# Patient Record
Sex: FEMALE | Race: WHITE | NOT HISPANIC OR LATINO | Employment: OTHER | URBAN - METROPOLITAN AREA
[De-identification: names, ages, dates, MRNs, and addresses within clinical notes are randomized per-mention and may not be internally consistent; named-entity substitution may affect disease eponyms.]

---

## 2017-01-09 ENCOUNTER — OFFICE VISIT (OUTPATIENT)
Dept: MEDICAL GROUP | Facility: MEDICAL CENTER | Age: 82
End: 2017-01-09
Payer: MEDICARE

## 2017-01-09 VITALS
WEIGHT: 130 LBS | HEIGHT: 62 IN | SYSTOLIC BLOOD PRESSURE: 134 MMHG | HEART RATE: 78 BPM | BODY MASS INDEX: 23.92 KG/M2 | TEMPERATURE: 97.8 F | RESPIRATION RATE: 16 BRPM | DIASTOLIC BLOOD PRESSURE: 72 MMHG | OXYGEN SATURATION: 95 %

## 2017-01-09 DIAGNOSIS — F03.90 DEMENTIA WITHOUT BEHAVIORAL DISTURBANCE, UNSPECIFIED DEMENTIA TYPE: ICD-10-CM

## 2017-01-09 DIAGNOSIS — Z00.00 MEDICARE ANNUAL WELLNESS VISIT, SUBSEQUENT: ICD-10-CM

## 2017-01-09 DIAGNOSIS — M81.0 OSTEOPOROSIS: ICD-10-CM

## 2017-01-09 DIAGNOSIS — F41.9 ANXIETY: ICD-10-CM

## 2017-01-09 DIAGNOSIS — F31.81 BIPOLAR 2 DISORDER (HCC): ICD-10-CM

## 2017-01-09 DIAGNOSIS — K57.80 DIVERTICULITIS OF INTESTINE WITH PERFORATION WITHOUT BLEEDING, UNSPECIFIED PART OF INTESTINAL TRACT: ICD-10-CM

## 2017-01-09 PROCEDURE — 99213 OFFICE O/P EST LOW 20 MIN: CPT | Mod: 25 | Performed by: NURSE PRACTITIONER

## 2017-01-09 PROCEDURE — G0439 PPPS, SUBSEQ VISIT: HCPCS | Performed by: NURSE PRACTITIONER

## 2017-01-09 RX ORDER — ALPRAZOLAM 0.25 MG/1
0.25 TABLET ORAL NIGHTLY PRN
Qty: 15 TAB | Refills: 5 | Status: SHIPPED | OUTPATIENT
Start: 2017-01-09 | End: 2017-03-03

## 2017-01-09 ASSESSMENT — PATIENT HEALTH QUESTIONNAIRE - PHQ9: CLINICAL INTERPRETATION OF PHQ2 SCORE: 0

## 2017-01-09 NOTE — MR AVS SNAPSHOT
"        Joelle Sotelo   2017 2:00 PM   Office Visit   MRN: [id number]    Department:  [address] [place] [address]   Dept Phone:  [phone]    Description:  Female : 1928   Provider:  LEANNA Daniel           Reason for Visit     Medication Refill xanax       Allergies as of 2017     Allergen Noted Reactions    Morphine 2015       Pt got extremely confused and agitated after morphine dose.       You were diagnosed with     Anxiety   [626406]       Dementia without behavioral disturbance, unspecified dementia type   [9470154]       Osteoporosis   [9659422]       Bipolar 2 disorder (HCC)   [980486]       Diverticulitis of intestine with perforation without bleeding, unspecified part of intestinal tract   [6034043]       H/O colostomy   [052924]         Vital Signs     Blood Pressure Pulse Temperature Respirations Height Weight    134/72 mmHg 78 36.6 °C (97.8 °F) 16 1.575 m (5' 2.01\") 58.968 kg (130 lb)    Body Mass Index Oxygen Saturation Smoking Status             23.77 kg/m2 95% Never Smoker          Basic Information     Date Of Birth Sex Race Ethnicity Preferred Language    1928 Female White Non- English      Your appointments     2017 11:00 AM   Follow Up Visit with Anoop Rodriguez M.D.   [facility] (--)    [address] [place] [address], [address]  [facility] [address] [phone]           You will be receiving a confirmation call a few days before your appointment from our automated call confirmation system.              Problem List              ICD-10-CM Priority Class Noted - Resolved    Bipolar 2 disorder (HCC) F31.81   5/15/2013 - Present    Osteoporosis M81.0   2014 - Present    Diverticulitis of intestine with perforation K57.80 High  2015 - Present    MEDICAL HOME    2015 - Present    H/O colostomy Z98.890   3/31/2015 - Present    Risk for falls Z91.81   2015 - Present    Dementia F03.90   2016 - Present      Health " Maintenance        Date Due Completion Dates    IMM ZOSTER VACCINE 2/17/1988 ---    IMM INFLUENZA (1) 9/1/2016 10/25/2015, 10/2/2014    BONE DENSITY 9/26/2016 9/26/2011, 9/24/2010, 9/23/2009, 9/8/2008, 7/20/2006, 6/11/2004            Current Immunizations     13-VALENT PCV PREVNAR 10/25/2015  9:12 PM    Influenza TIV (IM) 10/2/2014    Influenza Vaccine Adult HD 10/25/2015 10:06 PM    Influenza Vaccine Quad Inj (Preserved) 11/12/2016    Pneumococcal polysaccharide vaccine (PPSV-23) 11/2/2012      Below and/or attached are the medications your provider expects you to take. Review all of your home medications and newly ordered medications with your provider and/or pharmacist. Follow medication instructions as directed by your provider and/or pharmacist. Please keep your medication list with you and share with your provider. Update the information when medications are discontinued, doses are changed, or new medications (including over-the-counter products) are added; and carry medication information at all times in the event of emergency situations     Allergies:  MORPHINE - (reactions not documented)               Medications  Valid as of: January 09, 2017 -  2:24 PM    Generic Name Brand Name Tablet Size Instructions for use    ALPRAZolam (Tab) XANAX 0.25 MG Take 1 Tab by mouth at bedtime as needed for Sleep.        Ferrous Sulfate (Tab) ferrous sulfate 325 (65 FE) MG Take 1 Tab by mouth every day.        Misc. Devices (Misc) Misc. Devices  Incontinence briefs to use PRN due to incontinence.        RisperiDONE (Tab) RISPERDAL 4 MG TAKE  ONE TABLET BY MOUTH NIGHTLY AT BEDTIME        Saccharomyces boulardii (Cap) FLORASTOR 250 MG Take 1 Cap by mouth every day.        .                 Medicines prescribed today were sent to:     [id number] - [place], [place] - [address] [address]    Phone: [phone] Fax: [phone]    Open 24 Hours?: No      Medication refill instructions:        If your prescription bottle indicates you have medication refills left, it is not necessary to call your provider’s office. Please contact your pharmacy and they will refill your medication.    If your prescription bottle indicates you do not have any refills left, you may request refills at any time through one of the following ways: The online [employer] system (except Urgent Care), by calling your provider’s office, or by asking your pharmacy to contact your provider’s office with a refill request. Medication refills are processed only during regular business hours and may not be available until the next business day. Your provider may request additional information or to have a follow-up visit with you prior to refilling your medication.   *Please Note: Medication refills are assigned a new Rx number when refilled electronically. Your pharmacy may indicate that no refills were authorized even though a new prescription for the same medication is available at the pharmacy. Please request the medicine by name with the pharmacy before contacting your provider for a refill.        Other Notes About Your Plan     Patient is enrolled in Medical home with [facility] Status: Patient Declined

## 2017-01-10 NOTE — PROGRESS NOTES
CC: Patient is here today accompanied by her friend for annual Medicare wellness visit as well as issues with anxiety.    HPI:   Joelle presents today with the following.    1. Medicare annual wellness visit, subsequent  Screening performed below.      2. Anxiety  Patient would like a refill on Xanax for anxiety and sleep. It does not appear she has filled the prescription in a few months but she states she likes to have it available when she is especially anxious. She states she has had no problems with falls or oversedation when she took the medicine. She does not feel she needs to take a daily SSRI since her anxiety tends to come and go. It usually affects her in the night and that is when she takes the medicine.    3. Dementia without behavioral disturbance, unspecified dementia type  Patient has been seen by neurology and continues on Risperdal. Her friend who is with her states he and his wife continue to check on the patient every day. They do not feel that she is at risk.    4. Osteoporosis  Patient has previous history of osteoporosis but did not want to use Fosamax. No recent fractures. She does not want bone density scans.    5. Bipolar 2 disorder (HCC)  Patient currently taking Risperdal and does not wish counseling.    6. Diverticulitis of intestine with perforation without bleeding, unspecified part of intestinal tract  No recurrence of diverticulitis.    7. H/O colostomy  Patient states her colostomy is working well and I have been providing her with supplies.      Depression Screening    Little interest or pleasure in doing things?  0 - not at all  Feeling down, depressed , or hopeless? 0 - not at all  Trouble falling or staying asleep, or sleeping too much?     Feeling tired or having little energy?     Poor appetite or overeating?     Feeling bad about yourself - or that you are a failure or have let yourself or your family down?    Trouble concentrating on things, such as reading the newspaper or  watching television?    Moving or speaking so slowly that other people could have noticed.  Or the opposite - being so fidgety or restless that you have been moving around a lot more than usual?     Thoughts that you would be better off dead, or of hurting yourself?     Patient Health Questionnaire Score:      If depressive symptoms identified deferred to follow up visit unless specifically addressed in assesment and plan.      Screening for Cognitive Impairment    Three Minute Recall (banana, sunrise, fence)  3/3    Draw clock face with all 12 numbers set to the hand to show 10 minures past 11 o'clock  1    Cognitive concerns identified defferred for follow up unless specifically addressed in assesment and plan.    Fall Risk Assessment    Has the patient had two or more falls in the last year or any fall with injury in the last year?  No    Safety Assessment    Throw rugs on floor.  Yes  Handrails on all stairs.  Yes  Good lighting in all hallways.  Yes  Difficulty hearing.  Yes  Patient counseled about all safety risks that were identified.    Functional Assessment ADLs    Are there any barriers preventing you from cooking for yourself or meeting nutritional needs?  No.    Are there any barriers preventing you from driving safely or obtaining transportation?  No.    Are there any barriers preventing you from using a telephone or calling for help?  No.    Are there any barriers preventing you from shopping?  No.    Are there any barriers preventing you from taking care of your own finances?  No.    Are there any barriers preventing you from managing your medications?  No.    Are currently engaing any exercise or physical activity?  Yes.       Health Maintenance Summary                IMM ZOSTER VACCINE Overdue 2/17/1988     Annual Wellness Visit Overdue 3/12/2016      Done 3/12/2015 Visit Dx: Medicare annual wellness visit, initial    IMM INFLUENZA Overdue 9/1/2016      Done 10/25/2015 Imm Admin: Influenza Vaccine  "Adult HD     Patient has more history with this topic...    BONE DENSITY Overdue 9/26/2016      Done 9/26/2011 DS-BONE DENSITY STUDY (DEXA)     Patient has more history with this topic...          Patient Care Team:  LEANNA Daniel as PCP - General (Family Medicine)  Laurel Whittaker R.N. as Medical Home Care Manager        Patient Active Problem List    Diagnosis Date Noted   • Diverticulitis of intestine with perforation 01/02/2015     Priority: High   • Dementia 07/07/2016   • Risk for falls 11/09/2015   • H/O colostomy 03/31/2015   • MEDICAL HOME 01/13/2015   • Osteoporosis 09/08/2014   • Bipolar 2 disorder (HCC) 05/15/2013       Current Outpatient Prescriptions   Medication Sig Dispense Refill   • alprazolam (XANAX) 0.25 MG Tab Take 1 Tab by mouth at bedtime as needed for Sleep. 15 Tab 5   • ferrous sulfate 325 (65 FE) MG tablet Take 1 Tab by mouth every day. 30 Tab 5   • Misc. Devices Misc Incontinence briefs to use PRN due to incontinence. 1 Package 11   • risperidone (RISPERDAL) 4 MG tablet TAKE  ONE TABLET BY MOUTH NIGHTLY AT BEDTIME 30 Tab 10   • saccharomyces boulardii (FLORASTOR) 250 MG Cap Take 1 Cap by mouth every day. 30 Cap 5     No current facility-administered medications for this visit.         Allergies as of 01/09/2017 - Bret as Reviewed 01/09/2017   Allergen Reaction Noted   • Morphine  01/04/2015        ROS: As per HPI.    /72 mmHg  Pulse 78  Temp(Src) 36.6 °C (97.8 °F)  Resp 16  Ht 1.575 m (5' 2.01\")  Wt 58.968 kg (130 lb)  BMI 23.77 kg/m2  SpO2 95%    Physical Exam:  Gen:         Alert and oriented, No apparent distress. Patient's friend helps with some of the history.  Neck:        No Lymphadenopathy or Bruits.  Lungs:     Clear to auscultation bilaterally  CV:          Regular rate and rhythm. No murmurs, rubs or gallops.  Abd:         Soft non tender, non distended. Normal active bowel sounds.  No  Hepatosplenomegaly, No pulsatile masses. Colostomy bag in place and " functioning well.                  Ext:          No clubbing, cyanosis, edema.      Assessment and Plan.   88 y.o. female with the following issues.    1. Medicare annual wellness visit, subsequent  Annual wellness topics discussed, review of chronic medical problems completed    - Annual Wellness Visit - Includes PPPS Subsequent ()    2. Anxiety  I reviewed again with patient and her friend the risks of this medication. She insists that she needs it to have available and I provided her with a prescription to use no more than 2 or 3 times per week if needed. Review of her pharmacy records show she has not been using it for a few months and is not getting medications from other offices.  - alprazolam (XANAX) 0.25 MG Tab; Take 1 Tab by mouth at bedtime as needed for Sleep.  Dispense: 15 Tab; Refill: 5    3. Dementia without behavioral disturbance, unspecified dementia type  Patient will follow up with neurology as needed. Her friend does not feel she is at risk and checks in on her daily.    4. Osteoporosis  I recommended considering a bone density scan.    5. Bipolar 2 disorder (HCC)  Patient is on Risperdal and feels it is helpful. She does not wish to see a psychiatrist.    6. Diverticulitis of intestine with perforation without bleeding, unspecified part of intestinal tract  Patient reports no problems with her bowels.    7. H/O colostomy  Patient's colostomy appears to be working well.

## 2017-01-12 ENCOUNTER — APPOINTMENT (OUTPATIENT)
Dept: NEUROLOGY | Facility: MEDICAL CENTER | Age: 82
End: 2017-01-12
Payer: MEDICARE

## 2017-01-27 ENCOUNTER — TELEPHONE (OUTPATIENT)
Dept: MEDICAL GROUP | Facility: MEDICAL CENTER | Age: 82
End: 2017-01-27

## 2017-01-27 NOTE — TELEPHONE ENCOUNTER
1. Caller Name: Jorje                                         Call Back Number: [phone]      Patient approves a detailed voicemail message: N/A    Caregiver called and is requesting a change to her xanax to #30 a day. He states that patient is taking one every night because it helps her sleep. I let him know what the last office note stated about only 2-3 times weekly but he would like to see about having it increased still. Please advise.

## 2017-01-27 NOTE — TELEPHONE ENCOUNTER
She was able to get by without any benzodiazepine medication for the last few months and when I saw her, I told her I do not want her using it daily because of the risks involved. I still did not want her using it more than 2-3 times per week. I will not write for a higher dosage or amount.

## 2017-02-03 ENCOUNTER — PATIENT OUTREACH (OUTPATIENT)
Dept: HEALTH INFORMATION MANAGEMENT | Facility: OTHER | Age: 82
End: 2017-02-03

## 2017-02-03 NOTE — PROGRESS NOTES
Outcome: Patient not due for AWV at this time.  dropped 1/9/17 -- No outreach made --  AWV    Attempt # Final    Annual Wellness Visit Scheduling  Scheduling Status: Not Scheduled  If Not Scheduled, Choose Reason Why: Patient not due at this time    Care Gap Scheduling (Attempt to Schedule EACH Overdue Care Gap!)  Health Maintenance Due   Topic Date Due   • IMM ZOSTER VACCINE  02/17/1988   • BONE DENSITY  09/26/2016         MyChart Activation:  Declined

## 2017-02-06 ENCOUNTER — TELEPHONE (OUTPATIENT)
Dept: HEALTH INFORMATION MANAGEMENT | Facility: OTHER | Age: 82
End: 2017-02-06

## 2017-02-06 NOTE — TELEPHONE ENCOUNTER
Received incoming call from ADSD worker Juana to report some concerns, she wanted PCP to be aware of for pt's appt. Pt has appt tomorrow 02/07/2017 with PCP.     ADSD report concerns related to pt's memory. Indicated pt seems to have difficulties with her short term memory. Also reported pt has a rash under breast that she would like PCP to look at. Lastly reported she does not believe pt is taking her medications. Indicated when she visited last Monday and Thursday, pt pill box was full. Pt took meds after being reminded by ADSD worker but all other days were full despite pt indicating she was taking her meds. ADSD would like to know if pt is not taking her medication, could this be detrimental to her health? Indicated that if pt not taking her medicine could affect her health agency may need to look into alternative options; thus, needs insight from provider to move forward.     Lastly reported pt is currently receiving personal care services and homemaker services from [facility]. He receives 45 mins/week in personal care/ADLs and 4 hours/week in homemaker/IADLs.     Plan:  · Information routed to PCP as FYI and for follow up with pt during appt.

## 2017-02-07 ENCOUNTER — OFFICE VISIT (OUTPATIENT)
Dept: MEDICAL GROUP | Facility: MEDICAL CENTER | Age: 82
End: 2017-02-07
Payer: MEDICARE

## 2017-02-07 VITALS
HEART RATE: 78 BPM | RESPIRATION RATE: 16 BRPM | DIASTOLIC BLOOD PRESSURE: 74 MMHG | SYSTOLIC BLOOD PRESSURE: 132 MMHG | BODY MASS INDEX: 25.58 KG/M2 | WEIGHT: 139 LBS | HEIGHT: 62 IN | OXYGEN SATURATION: 96 % | TEMPERATURE: 98.7 F

## 2017-02-07 DIAGNOSIS — F03.90 DEMENTIA WITHOUT BEHAVIORAL DISTURBANCE, UNSPECIFIED DEMENTIA TYPE: ICD-10-CM

## 2017-02-07 DIAGNOSIS — B37.2 CANDIDAL INTERTRIGO: ICD-10-CM

## 2017-02-07 DIAGNOSIS — F31.81 BIPOLAR 2 DISORDER (HCC): ICD-10-CM

## 2017-02-07 PROCEDURE — 99214 OFFICE O/P EST MOD 30 MIN: CPT | Performed by: NURSE PRACTITIONER

## 2017-02-07 PROCEDURE — 1101F PT FALLS ASSESS-DOCD LE1/YR: CPT | Performed by: NURSE PRACTITIONER

## 2017-02-07 PROCEDURE — 1036F TOBACCO NON-USER: CPT | Performed by: NURSE PRACTITIONER

## 2017-02-07 PROCEDURE — G8420 CALC BMI NORM PARAMETERS: HCPCS | Performed by: NURSE PRACTITIONER

## 2017-02-07 PROCEDURE — 4040F PNEUMOC VAC/ADMIN/RCVD: CPT | Performed by: NURSE PRACTITIONER

## 2017-02-07 PROCEDURE — G8432 DEP SCR NOT DOC, RNG: HCPCS | Performed by: NURSE PRACTITIONER

## 2017-02-07 PROCEDURE — G8482 FLU IMMUNIZE ORDER/ADMIN: HCPCS | Performed by: NURSE PRACTITIONER

## 2017-02-07 RX ORDER — KETOCONAZOLE 20 MG/G
1 CREAM TOPICAL DAILY
Qty: 1 TUBE | Refills: 0 | Status: SHIPPED | OUTPATIENT
Start: 2017-02-07 | End: 2017-03-03

## 2017-02-07 ASSESSMENT — ENCOUNTER SYMPTOMS: MEMORY LOSS: 1

## 2017-02-07 NOTE — MR AVS SNAPSHOT
"        Jolele Sotelo   2017 8:20 AM   Office Visit   MRN: [id number]    Department:  [address] [place] [address]   Dept Phone:  [phone]    Description:  Female : 1928   Provider:  Narciso Aguiar A.PDemetriusN.           Reason for Visit     Rash under breast, is been there for months       Allergies as of 2017     Allergen Noted Reactions    Morphine 2015       Pt got extremely confused and agitated after morphine dose.       You were diagnosed with     Dementia without behavioral disturbance, unspecified dementia type   [6889173]       Candidal intertrigo   [716923]       Bipolar 2 disorder (CMS-[facility])   [303410]       H/O colostomy   [392125]         Vital Signs     Blood Pressure Pulse Temperature Respirations Height Weight    132/74 mmHg 78 37.1 °C (98.7 °F) 16 1.575 m (5' 2.01\") 63.05 kg (139 lb)    Body Mass Index Oxygen Saturation Smoking Status             25.42 kg/m2 96% Never Smoker          Basic Information     Date Of Birth Sex Race Ethnicity Preferred Language    1928 Female White Non- English      Problem List              ICD-10-CM Priority Class Noted - Resolved    Bipolar 2 disorder (CMS-[facility]) F31.81   5/15/2013 - Present    Osteoporosis M81.0   2014 - Present    Diverticulitis of intestine with perforation K57.80 High  2015 - Present    MEDICAL HOME    2015 - Present    H/O colostomy Z98.890   3/31/2015 - Present    Risk for falls Z91.81   2015 - Present    Dementia F03.90   2016 - Present      Health Maintenance        Date Due Completion Dates    IMM ZOSTER VACCINE 1988 ---    BONE DENSITY 2016, 2010, 2009, 2008, 2006, 2004            Current Immunizations     13-VALENT PCV PREVNAR 10/25/2015  9:12 PM    Influenza TIV (IM) 10/2/2014    Influenza Vaccine Adult HD 10/25/2015 10:06 PM    Influenza Vaccine Quad Inj (Preserved) 2016    Pneumococcal polysaccharide vaccine (PPSV-23) 2012      "   Below and/or attached are the medications your provider expects you to take. Review all of your home medications and newly ordered medications with your provider and/or pharmacist. Follow medication instructions as directed by your provider and/or pharmacist. Please keep your medication list with you and share with your provider. Update the information when medications are discontinued, doses are changed, or new medications (including over-the-counter products) are added; and carry medication information at all times in the event of emergency situations     Allergies:  MORPHINE - (reactions not documented)               Medications  Valid as of: February 07, 2017 -  9:51 AM    Generic Name Brand Name Tablet Size Instructions for use    ALPRAZolam (Tab) XANAX 0.25 MG Take 1 Tab by mouth at bedtime as needed for Sleep.        Ferrous Sulfate (Tab) ferrous sulfate 325 (65 FE) MG Take 1 Tab by mouth every day.        Ketoconazole (Cream) NIZORAL 2 % Apply 1 Application to affected area(s) every day.        Misc. Devices (Misc) Misc. Devices  Incontinence briefs to use PRN due to incontinence.        RisperiDONE (Tab) RISPERDAL 4 MG TAKE  ONE TABLET BY MOUTH NIGHTLY AT BEDTIME        Saccharomyces boulardii (Cap) FLORASTOR 250 MG Take 1 Cap by mouth every day.        .                 Medicines prescribed today were sent to:     [id number] - [place], [place] - [address] [address]    Phone: [phone] Fax: [phone]    Open 24 Hours?: No      Medication refill instructions:       If your prescription bottle indicates you have medication refills left, it is not necessary to call your provider’s office. Please contact your pharmacy and they will refill your medication.    If your prescription bottle indicates you do not have any refills left, you may request refills at any time through one of the following ways: The online [employer] system (except Urgent Care), by calling your  provider’s office, or by asking your pharmacy to contact your provider’s office with a refill request. Medication refills are processed only during regular business hours and may not be available until the next business day. Your provider may request additional information or to have a follow-up visit with you prior to refilling your medication.   *Please Note: Medication refills are assigned a new Rx number when refilled electronically. Your pharmacy may indicate that no refills were authorized even though a new prescription for the same medication is available at the pharmacy. Please request the medicine by name with the pharmacy before contacting your provider for a refill.        Your To Do List     Future Labs/Procedures Complete By Expires    CBC WITH DIFFERENTIAL  As directed 2/8/2018    COMP METABOLIC PANEL  As directed 2/8/2018    TSH  As directed 2/8/2018    VITAMIN B12  As directed 2/7/2018      Referral     A referral request has been sent to our patient care coordination department. Please allow 3-5 business days for us to process this request and contact you either by phone or mail. If you do not hear from us by the 5th business day, please call us at [phone].        Other Notes About Your Plan     ADSD Worker: Juana [phone]           MyChart Status: Patient Declined

## 2017-02-07 NOTE — PROGRESS NOTES
Subjective:      Joelle Sotelo is a 88 y.o. female who presents with Rash            Rash    Joelle Sotelo is here today for rash under the breasts as well as some concerns about short-term memory.      1. Dementia without behavioral disturbance, unspecified dementia type  I received a message from patient's  regarding her ADSD worker concerns because patient did not take all of her medications for the week. Patient states she normally does take her medicines which are usually her iron , risperidone, and floristore. She has no history of hypertension, dyslipidemia or heart disease. Patient was seen by neurology last year with the above diagnosis made. She was supposed to have six-month follow-up this month but it looks like it was canceled. Patient feels she is okay and her home with assistance that comes in.    2. Candidal intertrigo  Patient states she noticed a rash under her breast a few weeks ago and it is mildly pruritic. She has no rash elsewhere.    3. Bipolar 2 disorder (CMS-[facility])  Patient is supposed to be on Risperdal 4 mg daily and there is no history of behavioral issues. She is pleasant and talkative in the office.    4. H/O colostomy  Patient reports she is doing well with caring for her colostomy.    Current Outpatient Prescriptions   Medication Sig Dispense Refill   • ketoconazole (NIZORAL) 2 % Cream Apply 1 Application to affected area(s) every day. 1 Tube 0   • alprazolam (XANAX) 0.25 MG Tab Take 1 Tab by mouth at bedtime as needed for Sleep. 15 Tab 5   • ferrous sulfate 325 (65 FE) MG tablet Take 1 Tab by mouth every day. 30 Tab 5   • Misc. Devices Misc Incontinence briefs to use PRN due to incontinence. 1 Package 11   • risperidone (RISPERDAL) 4 MG tablet TAKE  ONE TABLET BY MOUTH NIGHTLY AT BEDTIME 30 Tab 10   • saccharomyces boulardii (FLORASTOR) 250 MG Cap Take 1 Cap by mouth every day. 30 Cap 5     No current facility-administered medications for this visit.  "    Family History   Problem Relation Age of Onset   • Other Mother      unknown   • Other Father      unknown     Past Medical History   Diagnosis Date   • Bipolar 2 disorder (CMS-[facility]) 5/15/2013   • Dyslipidemia 5/15/2013     Social History   Substance Use Topics   • Smoking status: Never Smoker    • Smokeless tobacco: Never Used   • Alcohol Use: No       Review of Systems   Skin: Positive for itching and rash.   Psychiatric/Behavioral: Positive for memory loss.   All other systems reviewed and are negative.         Objective:     /74 mmHg  Pulse 78  Temp(Src) 37.1 °C (98.7 °F)  Resp 16  Ht 1.575 m (5' 2.01\")  Wt 63.05 kg (139 lb)  BMI 25.42 kg/m2  SpO2 96%     Physical Exam   Constitutional: She is oriented to person, place, and time. She appears well-developed and well-nourished. No distress.   HENT:   Head: Normocephalic and atraumatic.   Right Ear: External ear normal.   Left Ear: External ear normal.   Nose: Nose normal.   Eyes: Right eye exhibits no discharge. Left eye exhibits no discharge.   Neck: Normal range of motion. Neck supple. No thyromegaly present.   Cardiovascular: Normal rate, regular rhythm and normal heart sounds.  Exam reveals no gallop and no friction rub.    No murmur heard.  Pulmonary/Chest: Effort normal and breath sounds normal. She has no wheezes. She has no rales.   Musculoskeletal: She exhibits no edema or tenderness.   Neurological: She is alert and oriented to person, place, and time. She displays normal reflexes.   Skin: Skin is warm and dry. Rash noted. She is not diaphoretic.   Mildly erythematous, moist rash under the breasts bilaterally. Colostomy in place on abdomen.   Psychiatric: She has a normal mood and affect. Her behavior is normal. Judgment and thought content normal.   Patient able to answer questions appropriately in the office.   Nursing note and vitals reviewed.              Assessment/Plan:     1. Dementia without behavioral disturbance, unspecified " dementia type  I reminded patient that she missed her 6 month follow-up with neurology and new referral was made. I reminded patient to be sure she takes her medicines regularly and I will send my note on to the . If the ADSD worker does not feel she is able to care for herself at home, then intervention will be needed.  - REFERRAL TO NEUROLOGY  - COMP METABOLIC PANEL; Future  - CBC WITH DIFFERENTIAL; Future  - TSH; Future  - VITAMIN B12; Future    2. Candidal intertrigo  Patient advised to use this cream for up to 4 weeks.  - ketoconazole (NIZORAL) 2 % Cream; Apply 1 Application to affected area(s) every day.  Dispense: 1 Tube; Refill: 0    3. Bipolar 2 disorder (CMS-[facility])  Patient reminded to take her Risperdal daily.    4. H/O colostomy  Patient reports no problems with her colostomy.

## 2017-03-03 ENCOUNTER — APPOINTMENT (OUTPATIENT)
Dept: RADIOLOGY | Facility: MEDICAL CENTER | Age: 82
End: 2017-03-03
Attending: EMERGENCY MEDICINE
Payer: MEDICARE

## 2017-03-03 ENCOUNTER — RESOLUTE PROFESSIONAL BILLING HOSPITAL PROF FEE (OUTPATIENT)
Dept: HOSPITALIST | Facility: MEDICAL CENTER | Age: 82
End: 2017-03-03
Payer: MEDICARE

## 2017-03-03 ENCOUNTER — HOSPITAL ENCOUNTER (OUTPATIENT)
Facility: MEDICAL CENTER | Age: 82
End: 2017-03-04
Attending: EMERGENCY MEDICINE | Admitting: INTERNAL MEDICINE
Payer: MEDICARE

## 2017-03-03 DIAGNOSIS — R55 SYNCOPE, UNSPECIFIED SYNCOPE TYPE: ICD-10-CM

## 2017-03-03 PROBLEM — I20.89 SYNCOPE ANGINOSA (HCC): Status: ACTIVE | Noted: 2017-03-03

## 2017-03-03 LAB
ALBUMIN SERPL BCP-MCNC: 3.4 G/DL (ref 3.2–4.9)
ALBUMIN/GLOB SERPL: 0.9 G/DL
ALP SERPL-CCNC: 51 U/L (ref 30–99)
ALT SERPL-CCNC: <5 U/L (ref 2–50)
ANION GAP SERPL CALC-SCNC: 11 MMOL/L (ref 0–11.9)
APPEARANCE UR: CLEAR
APTT PPP: 28.7 SEC (ref 24.7–36)
AST SERPL-CCNC: 14 U/L (ref 12–45)
BACTERIA #/AREA URNS HPF: ABNORMAL /HPF
BASOPHILS # BLD AUTO: 0.6 % (ref 0–1.8)
BASOPHILS # BLD: 0.06 K/UL (ref 0–0.12)
BILIRUB SERPL-MCNC: 0.8 MG/DL (ref 0.1–1.5)
BILIRUB UR QL STRIP.AUTO: NEGATIVE
BUN SERPL-MCNC: 25 MG/DL (ref 8–22)
CALCIUM SERPL-MCNC: 9.3 MG/DL (ref 8.5–10.5)
CHLORIDE SERPL-SCNC: 101 MMOL/L (ref 96–112)
CO2 SERPL-SCNC: 26 MMOL/L (ref 20–33)
COLOR UR: YELLOW
CREAT SERPL-MCNC: 0.75 MG/DL (ref 0.5–1.4)
EOSINOPHIL # BLD AUTO: 0.11 K/UL (ref 0–0.51)
EOSINOPHIL NFR BLD: 1 % (ref 0–6.9)
EPI CELLS #/AREA URNS HPF: ABNORMAL /HPF
ERYTHROCYTE [DISTWIDTH] IN BLOOD BY AUTOMATED COUNT: 42 FL (ref 35.9–50)
EST. AVERAGE GLUCOSE BLD GHB EST-MCNC: 103 MG/DL
GFR SERPL CREATININE-BSD FRML MDRD: >60 ML/MIN/1.73 M 2
GLOBULIN SER CALC-MCNC: 3.7 G/DL (ref 1.9–3.5)
GLUCOSE SERPL-MCNC: 165 MG/DL (ref 65–99)
GLUCOSE UR STRIP.AUTO-MCNC: NEGATIVE MG/DL
HBA1C MFR BLD: 5.2 % (ref 0–5.6)
HCT VFR BLD AUTO: 46.4 % (ref 37–47)
HGB BLD-MCNC: 15.1 G/DL (ref 12–16)
IMM GRANULOCYTES # BLD AUTO: 0.07 K/UL (ref 0–0.11)
IMM GRANULOCYTES NFR BLD AUTO: 0.7 % (ref 0–0.9)
INR PPP: 1.06 (ref 0.87–1.13)
KETONES UR STRIP.AUTO-MCNC: NEGATIVE MG/DL
LEUKOCYTE ESTERASE UR QL STRIP.AUTO: ABNORMAL
LYMPHOCYTES # BLD AUTO: 1.02 K/UL (ref 1–4.8)
LYMPHOCYTES NFR BLD: 9.6 % (ref 22–41)
MCH RBC QN AUTO: 28.8 PG (ref 27–33)
MCHC RBC AUTO-ENTMCNC: 32.5 G/DL (ref 33.6–35)
MCV RBC AUTO: 88.5 FL (ref 81.4–97.8)
MICRO URNS: ABNORMAL
MONOCYTES # BLD AUTO: 0.72 K/UL (ref 0–0.85)
MONOCYTES NFR BLD AUTO: 6.8 % (ref 0–13.4)
MUCOUS THREADS #/AREA URNS HPF: ABNORMAL /HPF
NEUTROPHILS # BLD AUTO: 8.68 K/UL (ref 2–7.15)
NEUTROPHILS NFR BLD: 81.3 % (ref 44–72)
NITRITE UR QL STRIP.AUTO: NEGATIVE
NRBC # BLD AUTO: 0 K/UL
NRBC BLD AUTO-RTO: 0 /100 WBC
PH UR STRIP.AUTO: 5 [PH]
PLATELET # BLD AUTO: 202 K/UL (ref 164–446)
PMV BLD AUTO: 10.6 FL (ref 9–12.9)
POTASSIUM SERPL-SCNC: 4 MMOL/L (ref 3.6–5.5)
PROT SERPL-MCNC: 7.1 G/DL (ref 6–8.2)
PROT UR QL STRIP: NEGATIVE MG/DL
PROTHROMBIN TIME: 14.1 SEC (ref 12–14.6)
RBC # BLD AUTO: 5.24 M/UL (ref 4.2–5.4)
RBC # URNS HPF: ABNORMAL /HPF
RBC UR QL AUTO: ABNORMAL
SODIUM SERPL-SCNC: 138 MMOL/L (ref 135–145)
SP GR UR STRIP.AUTO: 1.02
TROPONIN I SERPL-MCNC: <0.01 NG/ML (ref 0–0.04)
TSH SERPL DL<=0.005 MIU/L-ACNC: 2.79 UIU/ML (ref 0.3–3.7)
WBC # BLD AUTO: 10.7 K/UL (ref 4.8–10.8)
WBC #/AREA URNS HPF: ABNORMAL /HPF

## 2017-03-03 PROCEDURE — A9270 NON-COVERED ITEM OR SERVICE: HCPCS | Performed by: EMERGENCY MEDICINE

## 2017-03-03 PROCEDURE — 85730 THROMBOPLASTIN TIME PARTIAL: CPT

## 2017-03-03 PROCEDURE — 99285 EMERGENCY DEPT VISIT HI MDM: CPT

## 2017-03-03 PROCEDURE — 96372 THER/PROPH/DIAG INJ SC/IM: CPT

## 2017-03-03 PROCEDURE — 85025 COMPLETE CBC W/AUTO DIFF WBC: CPT

## 2017-03-03 PROCEDURE — G0378 HOSPITAL OBSERVATION PER HR: HCPCS

## 2017-03-03 PROCEDURE — 700102 HCHG RX REV CODE 250 W/ 637 OVERRIDE(OP): Performed by: EMERGENCY MEDICINE

## 2017-03-03 PROCEDURE — 84484 ASSAY OF TROPONIN QUANT: CPT

## 2017-03-03 PROCEDURE — 700111 HCHG RX REV CODE 636 W/ 250 OVERRIDE (IP): Performed by: INTERNAL MEDICINE

## 2017-03-03 PROCEDURE — 700105 HCHG RX REV CODE 258: Performed by: EMERGENCY MEDICINE

## 2017-03-03 PROCEDURE — 84443 ASSAY THYROID STIM HORMONE: CPT

## 2017-03-03 PROCEDURE — 71010 DX-CHEST-PORTABLE (1 VIEW): CPT

## 2017-03-03 PROCEDURE — 83036 HEMOGLOBIN GLYCOSYLATED A1C: CPT

## 2017-03-03 PROCEDURE — 81001 URINALYSIS AUTO W/SCOPE: CPT

## 2017-03-03 PROCEDURE — 80053 COMPREHEN METABOLIC PANEL: CPT

## 2017-03-03 PROCEDURE — 93005 ELECTROCARDIOGRAM TRACING: CPT | Performed by: EMERGENCY MEDICINE

## 2017-03-03 PROCEDURE — 94760 N-INVAS EAR/PLS OXIMETRY 1: CPT

## 2017-03-03 PROCEDURE — 70450 CT HEAD/BRAIN W/O DYE: CPT

## 2017-03-03 PROCEDURE — 85610 PROTHROMBIN TIME: CPT

## 2017-03-03 PROCEDURE — 700105 HCHG RX REV CODE 258: Performed by: INTERNAL MEDICINE

## 2017-03-03 PROCEDURE — 99220 PR INITIAL OBSERVATION CARE,LEVL III: CPT | Performed by: INTERNAL MEDICINE

## 2017-03-03 RX ORDER — SODIUM CHLORIDE 9 MG/ML
1000 INJECTION, SOLUTION INTRAVENOUS ONCE
Status: COMPLETED | OUTPATIENT
Start: 2017-03-03 | End: 2017-03-03

## 2017-03-03 RX ORDER — BISACODYL 10 MG
10 SUPPOSITORY, RECTAL RECTAL
Status: DISCONTINUED | OUTPATIENT
Start: 2017-03-03 | End: 2017-03-04 | Stop reason: HOSPADM

## 2017-03-03 RX ORDER — AMOXICILLIN 250 MG
2 CAPSULE ORAL 2 TIMES DAILY
Status: DISCONTINUED | OUTPATIENT
Start: 2017-03-03 | End: 2017-03-04 | Stop reason: HOSPADM

## 2017-03-03 RX ORDER — HEPARIN SODIUM 5000 [USP'U]/ML
5000 INJECTION, SOLUTION INTRAVENOUS; SUBCUTANEOUS EVERY 8 HOURS
Status: DISCONTINUED | OUTPATIENT
Start: 2017-03-03 | End: 2017-03-04 | Stop reason: HOSPADM

## 2017-03-03 RX ORDER — SODIUM CHLORIDE 9 MG/ML
INJECTION, SOLUTION INTRAVENOUS CONTINUOUS
Status: DISPENSED | OUTPATIENT
Start: 2017-03-03 | End: 2017-03-04

## 2017-03-03 RX ORDER — POLYETHYLENE GLYCOL 3350 17 G/17G
1 POWDER, FOR SOLUTION ORAL
Status: DISCONTINUED | OUTPATIENT
Start: 2017-03-03 | End: 2017-03-04 | Stop reason: HOSPADM

## 2017-03-03 RX ADMIN — HEPARIN SODIUM 5000 UNITS: 5000 INJECTION, SOLUTION INTRAVENOUS; SUBCUTANEOUS at 17:48

## 2017-03-03 RX ADMIN — SODIUM CHLORIDE: 9 INJECTION, SOLUTION INTRAVENOUS at 17:44

## 2017-03-03 RX ADMIN — SODIUM CHLORIDE 1000 ML: 9 INJECTION, SOLUTION INTRAVENOUS at 12:45

## 2017-03-03 RX ADMIN — ASPIRIN 325 MG: 325 TABLET, DELAYED RELEASE ORAL at 13:45

## 2017-03-03 ASSESSMENT — LIFESTYLE VARIABLES
TOTAL SCORE: 0
HOW MANY TIMES IN THE PAST YEAR HAVE YOU HAD 5 OR MORE DRINKS IN A DAY: 0
TOTAL SCORE: 0
AVERAGE NUMBER OF DAYS PER WEEK YOU HAVE A DRINK CONTAINING ALCOHOL: 7
HAVE YOU EVER FELT YOU SHOULD CUT DOWN ON YOUR DRINKING: NO
ON A TYPICAL DAY WHEN YOU DRINK ALCOHOL HOW MANY DRINKS DO YOU HAVE: 1
EVER_SMOKED: NEVER
EVER HAD A DRINK FIRST THING IN THE MORNING TO STEADY YOUR NERVES TO GET RID OF A HANGOVER: NO
EVER FELT BAD OR GUILTY ABOUT YOUR DRINKING: NO
HAVE PEOPLE ANNOYED YOU BY CRITICIZING YOUR DRINKING: NO
CONSUMPTION TOTAL: NEGATIVE
DO YOU DRINK ALCOHOL: YES
EVER_SMOKED: NEVER
TOTAL SCORE: 0

## 2017-03-03 ASSESSMENT — PATIENT HEALTH QUESTIONNAIRE - PHQ9
1. LITTLE INTEREST OR PLEASURE IN DOING THINGS: NOT AT ALL
SUM OF ALL RESPONSES TO PHQ QUESTIONS 1-9: 0
2. FEELING DOWN, DEPRESSED, IRRITABLE, OR HOPELESS: NOT AT ALL
SUM OF ALL RESPONSES TO PHQ9 QUESTIONS 1 AND 2: 0

## 2017-03-03 ASSESSMENT — COPD QUESTIONNAIRES
DO YOU EVER COUGH UP ANY MUCUS OR PHLEGM?: NO/ONLY WITH OCCASIONAL COLDS OR INFECTIONS
COPD SCREENING SCORE: 2
HAVE YOU SMOKED AT LEAST 100 CIGARETTES IN YOUR ENTIRE LIFE: NO/DON'T KNOW
DURING THE PAST 4 WEEKS HOW MUCH DID YOU FEEL SHORT OF BREATH: NONE/LITTLE OF THE TIME
HAVE YOU SMOKED AT LEAST 100 CIGARETTES IN YOUR ENTIRE LIFE: NO/DON'T KNOW
DO YOU EVER COUGH UP ANY MUCUS OR PHLEGM?: NO/ONLY WITH OCCASIONAL COLDS OR INFECTIONS
DURING THE PAST 4 WEEKS HOW MUCH DID YOU FEEL SHORT OF BREATH: NONE/LITTLE OF THE TIME
COPD SCREENING SCORE: 2

## 2017-03-03 ASSESSMENT — PAIN SCALES - GENERAL
PAINLEVEL_OUTOF10: 0
PAINLEVEL_OUTOF10: 0

## 2017-03-03 NOTE — DISCHARGE PLANNING
Care Transition Team Assessment    Information Source  Orientation : Disoriented to Time  Information Given By: Patient  Informant's Name:  (carri)  Who is responsible for making decisions for patient? : Patient    Readmission Evaluation  Is this a readmission?: No    Elopement Risk  Legal Hold: No  Ambulatory or Self Mobile in Wheelchair: No-Not an Elopement Risk    Interdisciplinary Discharge Planning  Does Admitting Nurse Feel This Could be a Complex Discharge?: Yes  Primary Care Physician:  (Narciso Aguiar)  Lives with - Patient's Self Care Capacity: Alone and Able to Care For Self  Support Systems: None  Housing / Facility: 1 Story Apartment / Condo  Do You Take your Prescribed Medications Regularly:  (cant remember)  Able to Return to Previous ADL's:  (to the extint she is now able)  Mobility Issues:  (had gone to [employer] tp purchase cane when fainted)  Prior Services:  (unk)    Discharge Preparedness  What is your plan after discharge?:  (home, may need HH)  What are your discharge supports?:  (friends listed on FS only, remote family in [place])         Finances  Financial Barriers to Discharge: No              Advance Directive  Advance Directive?: None  Advance Directive offered?:  (given packet)    Domestic Abuse  Have you ever been the victim of abuse or violence?: No    Psychological Assessment  History of Substance Abuse:  (unk)    Discharge Risks or Barriers  Discharge risks or barriers?: Transportation, Mental health, Lives alone, no community support

## 2017-03-03 NOTE — ED NOTES
Pt expressing much concern regarding her cat at home, contacted social work regarding options for patient.

## 2017-03-03 NOTE — IP AVS SNAPSHOT
Vanilla Forums Access Code: Activation code not generated  Current Vanilla Forums Status: Patient Declined    "[employer]  A secure, online tool to manage your health information     [employer]’s Vanilla Forums® is a secure, online tool that connects you to your personalized health information from the privacy of your home -- day or night - making it very easy for you to manage your healthcare. Once the activation process is completed, you can even access your medical information using the Vanilla Forums miladys, which is available for free in the Apple Miladys store or Google Play store.     Vanilla Forums provides the following levels of access (as shown below):   My Chart Features   [facility] Primary Care Doctor [facility]  Specialists [facility]  Urgent  Care Non-[facility]  Primary Care  Doctor   Email your healthcare team securely and privately 24/7 X X X X   Manage appointments: schedule your next appointment; view details of past/upcoming appointments X      Request prescription refills. X      View recent personal medical records, including lab and immunizations X X X X   View health record, including health history, allergies, medications X X X X   Read reports about your outpatient visits, procedures, consult and ER notes X X X X   See your discharge summary, which is a recap of your hospital and/or ER visit that includes your diagnosis, lab results, and care plan. X X       How to register for Vanilla Forums:  1. Go to  [email or web address].  2. Click on the Sign Up Now box, which takes you to the New Member Sign Up page. You will need to provide the following information:  a. Enter your Vanilla Forums Access Code exactly as it appears at the top of this page. (You will not need to use this code after you’ve completed the sign-up process. If you do not sign up before the expiration date, you must request a new code.)   b. Enter your date of birth.   c. Enter your home email address.   d. Click Submit, and follow the next screen’s instructions.  3. Create a Vanilla Forums ID.  This will be your [employer] login ID and cannot be changed, so think of one that is secure and easy to remember.  4. Create a [employer] password. You can change your password at any time.  5. Enter your Password Reset Question and Answer. This can be used at a later time if you forget your password.   6. Enter your e-mail address. This allows you to receive e-mail notifications when new information is available in [employer].  7. Click Sign Up. You can now view your health information.    For assistance activating your [employer] account, call [phone]

## 2017-03-03 NOTE — ED NOTES
The Medication Reconciliation process has been completed by interviewing the patient who tells me that she does not take any meds.     Allergies have been reviewed  Antibiotic use in 30 days - NONE    Home Pharmacy:  [facility] - [place]

## 2017-03-03 NOTE — ED NOTES
Pt resting in bed with eyes closed, even and unlabored breathing. No distress noted. Awaiting bed placement.

## 2017-03-03 NOTE — DISCHARGE PLANNING
Assessment completed to best of pt ability, she is confused/demented on certain things.  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  Hygiene is poor to fair.   Lives in [facility] [facility] [place], no local family, uses bus for transportation.  May need official FREDDIE young, OT, PT prior to d/c.

## 2017-03-03 NOTE — ED NOTES
MD to bedside to update patient regarding resulting and pending admission. PT requesting food. V/o from doctor to give patient food. Verbalizes understanding.

## 2017-03-03 NOTE — ED PROVIDER NOTES
ED Provider Note    Scribed for Cristal Wellington M.D. by Chastity Calero. 3/3/2017, 12:27 PM.    Primary care provider: LEANNA Daniel  Means of arrival: EMS  History obtained from: Patient  History limited by: None     CHIEF COMPLAINT  Syncope     HPI  Joelle Sotelo is a 89 y.o. female who presents to the Emergency Department by ambulance following a syncopal episode which occurred at the grocery store just prior to arrival. Patient states she has been feeling well recently and is unsure what may have caused her symptoms. She denies having similar symptoms previously. Patient also denies chest pain or headaches as precursor symptoms to her syncopal episode. She reports she did not eat breakfast and this potentially could have been a cause, however she normally does not eat breakfast. Patient denies any episodes of vomiting, abdominal pain, nausea, hematochezia and diarrhea. She currently has a colostomy in place.     REVIEW OF SYSTEMS  Pertinent positives include near syncope, dizziness. Pertinent negatives include no chest pain, headache, nausea, vomiting, abdominal pain, hematochezia, diarrhea. All other systems reviewed and negative.     PAST MEDICAL HISTORY   has a past medical history of Bipolar 2 disorder (CMS-[facility]) (5/15/2013) and Dyslipidemia (5/15/2013).    SURGICAL HISTORY   has past surgical history that includes exploratory laparotomy (1/2/2015); colostomy (1/2/2015); sigmoid colectomy (1/2/2015); ercp (10/25/2015); and larissa by laparoscopy (10/25/2015).    SOCIAL HISTORY  Social History   Substance Use Topics   • Smoking status: Never Smoker    • Smokeless tobacco: Never Used   • Alcohol Use: No      History   Drug Use No       FAMILY HISTORY  Family History   Problem Relation Age of Onset   • Other Mother      unknown   • Other Father      unknown       CURRENT MEDICATIONS  Home Medications     Reviewed by Chuyita Lester R.N. (Registered Nurse) on 03/03/17 at 8405  [facility] List  "Status: Partial    Medication Last Dose Status    alprazolam (XANAX) 0.25 MG Tab  Active    ferrous sulfate 325 (65 FE) MG tablet  Active    ketoconazole (NIZORAL) 2 % Cream  Active    Misc. Devices Misc  Active    risperidone (RISPERDAL) 4 MG tablet  Active    saccharomyces boulardii (FLORASTOR) 250 MG Cap  Active                ALLERGIES  Allergies   Allergen Reactions   • Morphine      Pt got extremely confused and agitated after morphine dose.        PHYSICAL EXAM  VITAL SIGNS: /73 mmHg  Pulse 89  Temp(Src) 37.2 °C (98.9 °F)  Resp 17  Ht 1.575 m (5' 2\")  Wt 58.968 kg (130 lb)  BMI 23.77 kg/m2    Constitutional:  ELderly , able to answer questions  HENT: Nose is normal in appearance without rhinorrhea, external ears are normal,  moist mucous membranes  Eyes: Right pupil is reactive and 5 mm. Left pupil is reactive is a slightly smaller at 3 mm. Anicteric, there is no conjunctival drainage or pallor   Neck: The trachea is midline, there is no obvious mass or meningeal signs  Cardiovascular: Equal radial pulsation, regular rate and rhythm without murmurs gallops or rubs  Thorax & Lungs: Respiratory rate and effort are normal. There is normal chest excursion with respiration.  No wheezes rhonchi or rales noted.  Abdomen: Abdomen is normal in appearance. Normal brown stool in colostomy.   :  No CVA tenderness to palpation  Musculoskeletal: No deformities noted in all 4 extremities. Actively moves all 4 extremities  Skin: Visualized skin is warm, no erythema, no rash.  Neurologic:  Cranial nerves II through XII are intact there is no focal abnormality noted.  Psychiatric: Normal mood and mentation        DIAGNOSTIC STUDIES / PROCEDURES    LABS  Results for orders placed or performed during the hospital encounter of 03/03/17   CBC WITH DIFFERENTIAL   Result Value Ref Range    WBC 10.7 4.8 - 10.8 K/uL    RBC 5.24 4.20 - 5.40 M/uL    Hemoglobin 15.1 12.0 - 16.0 g/dL    Hematocrit 46.4 37.0 - 47.0 %    MCV " 88.5 81.4 - 97.8 fL    MCH 28.8 27.0 - 33.0 pg    MCHC 32.5 (L) 33.6 - 35.0 g/dL    RDW 42.0 35.9 - 50.0 fL    Platelet Count 202 164 - 446 K/uL    MPV 10.6 9.0 - 12.9 fL    Neutrophils-Polys 81.30 (H) 44.00 - 72.00 %    Lymphocytes 9.60 (L) 22.00 - 41.00 %    Monocytes 6.80 0.00 - 13.40 %    Eosinophils 1.00 0.00 - 6.90 %    Basophils 0.60 0.00 - 1.80 %    Immature Granulocytes 0.70 0.00 - 0.90 %    Nucleated RBC 0.00 /100 WBC    Neutrophils (Absolute) 8.68 (H) 2.00 - 7.15 K/uL    Lymphs (Absolute) 1.02 1.00 - 4.80 K/uL    Monos (Absolute) 0.72 0.00 - 0.85 K/uL    Eos (Absolute) 0.11 0.00 - 0.51 K/uL    Baso (Absolute) 0.06 0.00 - 0.12 K/uL    Immature Granulocytes (abs) 0.07 0.00 - 0.11 K/uL    NRBC (Absolute) 0.00 K/uL   COMP METABOLIC PANEL   Result Value Ref Range    Sodium 138 135 - 145 mmol/L    Potassium 4.0 3.6 - 5.5 mmol/L    Chloride 101 96 - 112 mmol/L    Co2 26 20 - 33 mmol/L    Anion Gap 11.0 0.0 - 11.9    Glucose 165 (H) 65 - 99 mg/dL    Bun 25 (H) 8 - 22 mg/dL    Creatinine 0.75 0.50 - 1.40 mg/dL    Calcium 9.3 8.5 - 10.5 mg/dL    AST(SGOT) 14 12 - 45 U/L    ALT(SGPT) <5 2 - 50 U/L    Alkaline Phosphatase 51 30 - 99 U/L    Total Bilirubin 0.8 0.1 - 1.5 mg/dL    Albumin 3.4 3.2 - 4.9 g/dL    Total Protein 7.1 6.0 - 8.2 g/dL    Globulin 3.7 (H) 1.9 - 3.5 g/dL    A-G Ratio 0.9 g/dL   TROPONIN   Result Value Ref Range    Troponin I <0.01 0.00 - 0.04 ng/mL   PRTOTHROMBIN TIME (INR)   Result Value Ref Range    PT 14.1 12.0 - 14.6 sec    INR 1.06 0.87 - 1.13   APTT   Result Value Ref Range    APTT 28.7 24.7 - 36.0 sec   ESTIMATED GFR   Result Value Ref Range    GFR If African American >60 >60 mL/min/1.73 m 2    GFR If Non African American >60 >60 mL/min/1.73 m 2   EKG (ER)   Result Value Ref Range    Report       [facility] Emergency Dept.    Test Date:  2017-03-03  Pt Name:    ROSS GUTIERREZ              Department: ER  MRN:        [id number]                      Room:         10  Gender:     F                            Technician: [id number]  :        [id number]                   Requested By:BOOM METZ  Order #:    [id number]                    Reading MD:    Measurements  Intervals                                Axis  Rate:       78                           P:          57  FL:         164                          QRS:        -79  QRSD:       78                           T:          25  QT:         372  QTc:        424    Interpretive Statements  SINUS ARRHYTHMIA, RATE  68- 91  CONSIDER INFERIOR INFARCT  Compared to ECG 10/24/2015 00:01:45  Myocardial infarct finding now present  Sinus tachycardia no longer present        All labs reviewed by me.    EKG  I interpreted this EKG myself.  This is a 12-lead study.  The rhythm is sinus with a rate of 78.  There are no ST segment nor T wave abnormalities.  Interpretation: No ST segment elevation myocardial infarction. Slight sinus arrhythmia noted.    RADIOLOGY  CT-HEAD W/O   Final Result      1.  Cerebral atrophy.      2.  White matter lucencies most consistent with small vessel ischemic change versus demyelination or gliosis.      3.  Otherwise, Head CT without contrast with no acute findings. No evidence of acute cerebral  hemorrhage or mass lesion.   4. Focal ectasia right vertebral artery.      DX-CHEST-PORTABLE (1 VIEW)   Final Result      Scattered interstitial opacities consistent with atelectasis/possible mild edema. Interstitial lung disease not excluded.   Left lung volume loss with mediastinal shift towards the left.        The radiologist's interpretation of all radiological studies have been reviewed by me.    COURSE & MEDICAL DECISION MAKING  Nursing notes and vital signs were reviewed. (See chart for details)  The patient's nursing records were reviewed which reveal the patient had a syncopal episode, history was obtained from the patient and she has never had similar symptoms previously;     The patient presents  following a syncopal episode, and the differential diagnosis includes but is not limited to syncope, drop attack, arrythmia, electrolyte abnormality.       12:27 PM Initial orders in the Emergency Department included cbc, cmp ekg, troponin, CT  head and initial treatment in the Emergency Department included NS hydration and the patient received IV  ns    ED testing reveals normal labs, CT concerning for vertebral calcifications, can't exclude posterior circulation symptoms, i.e. Drop attack.  DIscussed with pt, and willl admit.  Pt agreeable.  Asa 325 given.      FINAL IMPRESSION  1. Syncope, unspecified syncope type          I, Chastity Calero (Natalieibhilario), am scribing for, and in the presence of, Cristal Wellington M.D..    Electronically signed by: Chastity Calero (Deepti), 3/3/2017    ICristal M.D. personally performed the services described in this documentation, as scribed by Chastity Calero in my presence, and it is both accurate and complete.    The note accurately reflects work and decisions made by me.  Cristal Wellington  3/3/2017  1:23 PM

## 2017-03-03 NOTE — ED NOTES
Pt called out regarding pain in left arm Iv site. Swelling observed. IV fluids discontinued approx 200 ml NS infused. IV d/c'd. Warm compresses applied.

## 2017-03-03 NOTE — IP AVS SNAPSHOT
" Home Care Instructions                                                                                                                  Name:Joelle Sotelo  Medical Record Number:[id number]  CSN: [id number]    YOB: 1928   Age: 89 y.o.  Sex: female  HT:1.575 m (5' 2\") WT: 60.4 kg (133 lb 2.5 oz)          Admit Date: 3/3/2017     Discharge Date:   Today's Date: 3/4/2017  Attending Doctor:  Isaac Charles M.D.                  Allergies:  Morphine            Discharge Instructions       Discharge Instructions    Discharged to home by taxi with self. Discharged via wheelchair, hospital escort: Refused.  Special equipment needed: Cane    Be sure to schedule a follow-up appointment with your primary care doctor or any specialists as instructed.     Discharge Plan:   Diet Plan: Discussed  Activity Level: Discussed  Confirmed Follow up Appointment: Patient to Call and Schedule Appointment  Confirmed Symptoms Management: Discussed  Medication Reconciliation Updated: Yes  Influenza Vaccine Indication: Not indicated: Previously immunized this influenza season and > 8 years of age    I understand that a diet low in cholesterol, fat, and sodium is recommended for good health. Unless I have been given specific instructions below for another diet, I accept this instruction as my diet prescription.   Other diet: Heart-Healthy Eating Plan  Many factors influence your heart health, including eating and exercise habits. Heart (coronary) risk increases with abnormal blood fat (lipid) levels. Heart-healthy meal planning includes limiting unhealthy fats, increasing healthy fats, and making other small dietary changes. This includes maintaining a healthy body weight to help keep lipid levels within a normal range.  WHAT IS MY PLAN?   Your health care provider recommends that you:  · Reduce the total calories in your daily diet from fat.  · Limit your intake of saturated fat.  · Limit the amount of cholesterol in your " "diet.  WHAT TYPES OF FAT SHOULD I CHOOSE?  · Choose healthy fats more often. Choose monounsaturated and polyunsaturated fats, such as olive oil and canola oil, flaxseeds, walnuts, almonds, and seeds.  · Eat more omega-3 fats. Good choices include salmon, mackerel, sardines, tuna, flaxseed oil, and ground flaxseeds. Aim to eat fish at least two times each week.  · Limit saturated fats. Saturated fats are primarily found in animal products, such as meats, butter, and cream. Plant sources of saturated fats include palm oil, palm kernel oil, and coconut oil.  · Avoid foods with partially hydrogenated oils in them. These contain trans fats. Examples of foods that contain trans fats are stick margarine, some tub margarines, cookies, crackers, and other baked goods.  WHAT GENERAL GUIDELINES DO I NEED TO FOLLOW?  · Check food labels carefully to identify foods with trans fats or high amounts of saturated fat.  · Fill one half of your plate with vegetables and green salads. Eat 4-5 servings of vegetables per day. A serving of vegetables equals 1 cup of raw leafy vegetables, ½ cup of raw or cooked cut-up vegetables, or ½ cup of vegetable juice.  · Fill one fourth of your plate with whole grains. Look for the word \"whole\" as the first word in the ingredient list.  · Fill one fourth of your plate with lean protein foods.  · Eat 4-5 servings of fruit per day. A serving of fruit equals one medium whole fruit, ¼ cup of dried fruit, ½ cup of fresh, frozen, or canned fruit, or ½ cup of 100% fruit juice.  · Eat more foods that contain soluble fiber. Examples of foods that contain this type of fiber are apples, broccoli, carrots, beans, peas, and barley. Aim to get 20-30 g of fiber per day.  · Eat more home-cooked food and less restaurant, buffet, and fast food.  · Limit or avoid alcohol.  · Limit foods that are high in starch and sugar.  · Avoid fried foods.  · Cook foods by using methods other than frying. Baking, boiling, grilling, " and broiling are all great options. Other fat-reducing suggestions include:  · Removing the skin from poultry.  · Removing all visible fats from meats.  · Skimming the fat off of stews, soups, and gravies before serving them.  · Steaming vegetables in water or broth.  · Lose weight if you are overweight. Losing just 5-10% of your initial body weight can help your overall health and prevent diseases such as diabetes and heart disease.  · Increase your consumption of nuts, legumes, and seeds to 4-5 servings per week. One serving of dried beans or legumes equals ½ cup after being cooked, one serving of nuts equals 1½ ounces, and one serving of seeds equals ½ ounce or 1 tablespoon.  · You may need to monitor your salt (sodium) intake, especially if you have high blood pressure. Talk with your health care provider or dietitian to get more information about reducing sodium.  WHAT FOODS CAN I EAT?  Grains  Breads, including [place]ian, white, corina, wheat, raisin, rye, oatmeal, and Italian. Tortillas that are neither fried nor made with lard or trans fat. Low-fat rolls, including hotdog and hamburger buns and English muffins. Biscuits. Muffins. Waffles. Pancakes. Light popcorn. Whole-grain cereals. Flatbread. [place] toast. Pretzels. Breadsticks. Rusks. Low-fat snacks and crackers, including oyster, saltine, matzo, kelsey, animal, and rye. Rice and pasta, including brown rice and those that are made with whole wheat.  Vegetables  All vegetables.  Fruits  All fruits, but limit coconut.  Meats and Other Protein Sources  Lean, well-trimmed beef, veal, pork, and lamb. Chicken and turkey without skin. All fish and shellfish. Wild duck, rabbit, pheasant, and venison. Egg whites or low-cholesterol egg substitutes. Dried beans, peas, lentils, and tofu. Seeds and most nuts.  Dairy  Low-fat or nonfat cheeses, including ricotta, string, and mozzarella. Skim or 1% milk that is liquid, powdered, or evaporated. Buttermilk that is made with  low-fat milk. Nonfat or low-fat yogurt.  Beverages  Mineral water. Diet carbonated beverages.  Sweets and Desserts  Sherbets and fruit ices. Honey, jam, marmalade, jelly, and syrups. Meringues and gelatins. Pure sugar candy, such as hard candy, jelly beans, gumdrops, mints, marshmallows, and small amounts of dark chocolate. Messi food cake.  Eat all sweets and desserts in moderation.  Fats and Oils  Nonhydrogenated (trans-free) margarines. Vegetable oils, including soybean, sesame, sunflower, olive, peanut, safflower, corn, canola, and cottonseed. Salad dressings or mayonnaise that are made with a vegetable oil. Limit added fats and oils that you use for cooking, baking, salads, and as spreads.  Other  Cocoa powder. Coffee and tea. All seasonings and condiments.  The items listed above may not be a complete list of recommended foods or beverages. Contact your dietitian for more options.  WHAT FOODS ARE NOT RECOMMENDED?  Grains  Breads that are made with saturated or trans fats, oils, or whole milk. Croissants. Butter rolls. Cheese breads. Sweet rolls. Donuts. Buttered popcorn. Chow mein noodles. High-fat crackers, such as cheese or butter crackers.  Meats and Other Protein Sources  Fatty meats, such as hotdogs, short ribs, sausage, spareribs, siu, ribeye roast or steak, and mutton. High-fat deli meats, such as salami and bologna. Caviar. Domestic duck and goose. Organ meats, such as kidney, liver, sweetbreads, brains, gizzard, chitterlings, and heart.  Dairy  Cream, sour cream, cream cheese, and creamed cottage cheese. Whole milk cheeses, including blue (tej), [place], Brie, Trevor, American, Havarti, Swiss, cheddar, Camembert, and Lowry.  Whole or 2% milk that is liquid, evaporated, or condensed. Whole buttermilk. Cream sauce or high-fat cheese sauce. Yogurt that is made from whole milk.  Beverages  Regular sodas and drinks with added sugar.  Sweets and Desserts  Frosting. Pudding. Cookies. Cakes other  than javi food cake. Candy that has milk chocolate or white chocolate, hydrogenated fat, butter, coconut, or unknown ingredients. Buttered syrups. Full-fat ice cream or ice cream drinks.  Fats and Oils  Gravy that has suet, meat fat, or shortening. Cocoa butter, hydrogenated oils, palm oil, coconut oil, palm kernel oil. These can often be found in baked products, candy, fried foods, nondairy creamers, and whipped toppings. Solid fats and shortenings, including siu fat, salt pork, lard, and butter. Nondairy cream substitutes, such as coffee creamers and sour cream substitutes. Salad dressings that are made of unknown oils, cheese, or sour cream.  The items listed above may not be a complete list of foods and beverages to avoid. Contact your dietitian for more information.     This information is not intended to replace advice given to you by your health care provider. Make sure you discuss any questions you have with your health care provider.     Special Instructions: Urinary Tract Infection  Urinary tract infections (UTIs) can develop anywhere along your urinary tract. Your urinary tract is your body's drainage system for removing wastes and extra water. Your urinary tract includes two kidneys, two ureters, a bladder, and a urethra. Your kidneys are a pair of bean-shaped organs. Each kidney is about the size of your fist. They are located below your ribs, one on each side of your spine.  CAUSES  Infections are caused by microbes, which are microscopic organisms, including fungi, viruses, and bacteria. These organisms are so small that they can only be seen through a microscope. Bacteria are the microbes that most commonly cause UTIs.  SYMPTOMS   Symptoms of UTIs may vary by age and gender of the patient and by the location of the infection. Symptoms in young women typically include a frequent and intense urge to urinate and a painful, burning feeling in the bladder or urethra during urination. Older women and  men are more likely to be tired, shaky, and weak and have muscle aches and abdominal pain. A fever may mean the infection is in your kidneys. Other symptoms of a kidney infection include pain in your back or sides below the ribs, nausea, and vomiting.  DIAGNOSIS  To diagnose a UTI, your caregiver will ask you about your symptoms. Your caregiver also will ask to provide a urine sample. The urine sample will be tested for bacteria and white blood cells. White blood cells are made by your body to help fight infection.  TREATMENT   Typically, UTIs can be treated with medication. Because most UTIs are caused by a bacterial infection, they usually can be treated with the use of antibiotics. The choice of antibiotic and length of treatment depend on your symptoms and the type of bacteria causing your infection.  HOME CARE INSTRUCTIONS  · If you were prescribed antibiotics, take them exactly as your caregiver instructs you. Finish the medication even if you feel better after you have only taken some of the medication.  · Drink enough water and fluids to keep your urine clear or pale yellow.  · Avoid caffeine, tea, and carbonated beverages. They tend to irritate your bladder.  · Empty your bladder often. Avoid holding urine for long periods of time.  · Empty your bladder before and after sexual intercourse.  · After a bowel movement, women should cleanse from front to back. Use each tissue only once.  SEEK MEDICAL CARE IF:   · You have back pain.  · You develop a fever.  · Your symptoms do not begin to resolve within 3 days.  SEEK IMMEDIATE MEDICAL CARE IF:   · You have severe back pain or lower abdominal pain.  · You develop chills.  · You have nausea or vomiting.  · You have continued burning or discomfort with urination.  MAKE SURE YOU:   · Understand these instructions.  · Will watch your condition.  · Will get help right away if you are not doing well or get worse.     This information is not intended to replace advice  given to you by your health care provider. Make sure you discuss any questions you have with your health care provider.     Syncope  Syncope is a medical term for fainting or passing out. This means you lose consciousness and drop to the ground. People are generally unconscious for less than 5 minutes. You may have some muscle twitches for up to 15 seconds before waking up and returning to normal. Syncope occurs more often in older adults, but it can happen to anyone. While most causes of syncope are not dangerous, syncope can be a sign of a serious medical problem. It is important to seek medical care.   CAUSES   Syncope is caused by a sudden drop in blood flow to the brain. The specific cause is often not determined. Factors that can bring on syncope include:  · Taking medicines that lower blood pressure.  · Sudden changes in posture, such as standing up quickly.  · Taking more medicine than prescribed.  · Standing in one place for too long.  · Seizure disorders.  · Dehydration and excessive exposure to heat.  · Low blood sugar (hypoglycemia).  · Straining to have a bowel movement.  · Heart disease, irregular heartbeat, or other circulatory problems.  · Fear, emotional distress, seeing blood, or severe pain.  SYMPTOMS   Right before fainting, you may:  · Feel dizzy or light-headed.  · Feel nauseous.  · See all white or all black in your field of vision.  · Have cold, clammy skin.  DIAGNOSIS   Your health care provider will ask about your symptoms, perform a physical exam, and perform an electrocardiogram (ECG) to record the electrical activity of your heart. Your health care provider may also perform other heart or blood tests to determine the cause of your syncope which may include:  · Transthoracic echocardiogram (TTE). During echocardiography, sound waves are used to evaluate how blood flows through your heart.  · Transesophageal echocardiogram (ANUP).  · Cardiac monitoring. This allows your health care provider  to monitor your heart rate and rhythm in real time.  · Holter monitor. This is a portable device that records your heartbeat and can help diagnose heart arrhythmias. It allows your health care provider to track your heart activity for several days, if needed.  · Stress tests by exercise or by giving medicine that makes the heart beat faster.  TREATMENT   In most cases, no treatment is needed. Depending on the cause of your syncope, your health care provider may recommend changing or stopping some of your medicines.  HOME CARE INSTRUCTIONS  · Have someone stay with you until you feel stable.  · Do not drive, use machinery, or play sports until your health care provider says it is okay.  · Keep all follow-up appointments as directed by your health care provider.  · Lie down right away if you start feeling like you might faint. Breathe deeply and steadily. Wait until all the symptoms have passed.  · Drink enough fluids to keep your urine clear or pale yellow.  · If you are taking blood pressure or heart medicine, get up slowly and take several minutes to sit and then stand. This can reduce dizziness.  SEEK IMMEDIATE MEDICAL CARE IF:   · You have a severe headache.  · You have unusual pain in the chest, abdomen, or back.  · You are bleeding from your mouth or rectum, or you have black or tarry stool.  · You have an irregular or very fast heartbeat.  · You have pain with breathing.  · You have repeated fainting or seizure-like jerking during an episode.  · You faint when sitting or lying down.  · You have confusion.  · You have trouble walking.  · You have severe weakness.  · You have vision problems.  If you fainted, call your local emergency services (911 in [place] Do not drive yourself to the hospital.      This information is not intended to replace advice given to you by your health care provider. Make sure you discuss any questions you have with your health care provider.       · Is patient discharged on Warfarin /  Coumadin?   No     · Is patient Post Blood Transfusion?  No    Depression / Suicide Risk    As you are discharged from this [facility] facility, it is important to learn how to keep safe from harming yourself.    Recognize the warning signs:  · Abrupt changes in personality, positive or negative- including increase in energy   · Giving away possessions  · Change in eating patterns- significant weight changes-  positive or negative  · Change in sleeping patterns- unable to sleep or sleeping all the time   · Unwillingness or inability to communicate  · Depression  · Unusual sadness, discouragement and loneliness  · Talk of wanting to die  · Neglect of personal appearance   · Rebelliousness- reckless behavior  · Withdrawal from people/activities they love  · Confusion- inability to concentrate     If you or a loved one observes any of these behaviors or has concerns about self-harm, here's what you can do:  · Talk about it- your feelings and reasons for harming yourself  · Remove any means that you might use to hurt yourself (examples: pills, rope, extension cords, firearm)  · Get professional help from the community (Mental Health, Substance Abuse, psychological counseling)  · Do not be alone:Call your Safe Contact- someone whom you trust who will be there for you.  · Call your local CRISIS HOTLINE 578-1740 or 891-681-0806  · Call your local Children's Mobile Crisis Response Team [place] (923) 699-3376 or www.Jobydu  · Call the toll free National Suicide Prevention Hotlines   · National Suicide Prevention Lifeline 782-742-VSFW (2076)  · National Hope Line Network 800-SUICIDE (694-5249)        Follow-up Information     1. Follow up with LEANNA Daniel. Schedule an appointment as soon as possible for a visit in 1 week.    Specialty:  Family Medicine    Contact information    [address]  [place] [id number] [phone]           Discharge Medication Instructions:    Below are the  medications your physician expects you to take upon discharge:    Review all your home medications and newly ordered medications with your doctor and/or pharmacist. Follow medication instructions as directed by your doctor and/or pharmacist.    Please keep your medication list with you and share with your physician.               Medication List      START taking these medications        Instructions    sulfamethoxazole-trimethoprim 800-160 MG tablet   Last time this was given:  1 Tab on 3/4/2017  3:43 PM   Commonly known as:  BACTRIM DS    Take 1 Tab by mouth 2 times a day for 3 days.   Dose:  1 Tab               Instructions           Diet / Nutrition:    Follow any diet instructions given to you by your doctor or the dietician, including how much salt (sodium) you are allowed each day.    If you are overweight, talk to your doctor about a weight reduction plan.    Activity:    Remain physically active following your doctor's instructions about exercise and activity.    Rest often.     Any time you become even a little tired or short of breath, SIT DOWN and rest.    Worsening Symptoms:    Report any of the following signs and symptoms to the doctor's office immediately:    *Pain of jaw, arm, or neck  *Chest pain not relieved by medication                               *Dizziness or loss of consciousness  *Difficulty breathing even when at rest   *More tired than usual                                       *Bleeding drainage or swelling of surgical site  *Swelling of feet, ankles, legs or stomach                 *Fever (>100ºF)  *Pink or blood tinged sputum  *Weight gain (3lbs/day or 5lbs /week)           *Shock from internal defibrillator (if applicable)  *Palpitations or irregular heartbeats                *Cool and/or numb extremities    Stroke Awareness    Common Risk Factors for Stroke include:    Age  Atrial Fibrillation  Carotid Artery Stenosis  Diabetes Mellitus  Excessive alcohol consumption  High blood  pressure  Overweight   Physical inactivity  Smoking    Warning signs and symptoms of a stroke include:    *Sudden numbness or weakness of the face, arm or leg (especially on one side of the body).  *Sudden confusion, trouble speaking or understanding.  *Sudden trouble seeing in one or both eyes.  *Sudden trouble walking, dizziness, loss of balance or coordination.Sudden severe headache with no known cause.    It is very important to get treatment quickly when a stroke occurs. If you experience any of the above warning signs, call 911 immediately.                   Disclaimer         Quit Smoking / Tobacco Use:    I understand the use of any tobacco products increases my chance of suffering from future heart disease or stroke and could cause other illnesses which may shorten my life. Quitting the use of tobacco products is the single most important thing I can do to improve my health. For further information on smoking / tobacco cessation call a Toll Free Quit Line at 1-761.811.1200 (*National [employer] [place]) or 1-329.298.1579 (American Lung Association) or you can access the web based program at www.lungGigsTime.org.    [place] Tobacco Users Help Line:  (847) 319-2826       Toll Free: 1-378.873.9908  Quit Tobacco Program [facility] (136)053-9868    Crisis Hotline:    [place] Crisis Hotline:  9-234-GZVJFWP or 1-681.404.7923    [place] Crisis Hotline:    1-302.905.7486 or 216-176-4184    Discharge Survey:   Thank you for choosing [facility]. We hope we did everything we could to make your hospital stay a pleasant one. You may be receiving a phone survey and we would appreciate your time and participation in answering the questions. Your input is very valuable to us in our efforts to improve our service to our patients and their families.        My signature on this form indicates that:    1. I have reviewed and understand the above information.  2. My questions regarding this information have  been answered to my satisfaction.  3. I have formulated a plan with my discharge nurse to obtain my prescribed medications for home.                  Disclaimer         __________________________________                     __________       ________                       Patient Signature                                                 Date                    Time

## 2017-03-03 NOTE — IP AVS SNAPSHOT
" <p align=\"LEFT\"><IMG SRC=\"[email or web address]\" alt=\"Image\" WIDTH=\"50%\" HEIGHT=\"200\" BORDER=\"\"></p>                   Name:Joelle Sotelo  Medical Record Number:[id number]  CSN: [id number]    YOB: 1928   Age: 89 y.o.  Sex: female  HT:1.575 m (5' 2\") WT: 60.4 kg (133 lb 2.5 oz)          Admit Date: 3/3/2017     Discharge Date:   Today's Date: 3/4/2017  Attending Doctor:  Isaac Charles M.D.                  Allergies:  Morphine          Follow-up Information     1. Follow up with LEANNA Daniel. Schedule an appointment as soon as possible for a visit in 1 week.    Specialty:  Family Medicine    Contact information    [address] [id number] [phone]           Medication List      Take these Medications        Instructions    sulfamethoxazole-trimethoprim 800-160 MG tablet   Commonly known as:  BACTRIM DS    Take 1 Tab by mouth 2 times a day for 3 days.   Dose:  1 Tab         "

## 2017-03-03 NOTE — ED NOTES
"Pt brought to ER from [facility] via EMS.  Chief Complaint   Patient presents with   • Dizziness   Pt was walking through [facility], sudden onset lightheaded/dizzy. Drank Ensure from cart and felt better.     EMS reports friends of patient present at [facility]. report to EMS that patient \"acting her normal, normal speech, normal pupils\"       Initial NIH 0, denies dizziness/lightheaded upon arrival. PT repetitive.     "

## 2017-03-03 NOTE — ED NOTES
Completed swallow evaluation with patient, tolerated dry swallow, tolerated 3oz of water with no coughing. Pt given food per request and per v/o of ER MD

## 2017-03-03 NOTE — IP AVS SNAPSHOT
3/4/2017          Joelle Sotelo  [address]  [place] [address]    Dear Joelle:    Critical access hospital wants to ensure your discharge home is safe and you or your loved ones have had all your questions answered regarding your care after you leave the hospital.    You may receive a telephone call within two days of your discharge.  This call is to make certain you understand your discharge instructions as well as ensure we provided you with the best care possible during your stay with us.     The call will only last approximately 3-5 minutes and will be done by a nurse.    Once again, we want to ensure your discharge home is safe and that you have a clear understanding of any next steps in your care.  If you have any questions or concerns, please do not hesitate to contact us, we are here for you.  Thank you for choosing [facility] for your healthcare needs.    Sincerely,    Redd Harris    [facility]

## 2017-03-04 ENCOUNTER — APPOINTMENT (OUTPATIENT)
Dept: RADIOLOGY | Facility: MEDICAL CENTER | Age: 82
End: 2017-03-04
Attending: INTERNAL MEDICINE
Payer: MEDICARE

## 2017-03-04 VITALS
HEIGHT: 62 IN | TEMPERATURE: 98.4 F | HEART RATE: 67 BPM | WEIGHT: 133.16 LBS | OXYGEN SATURATION: 93 % | DIASTOLIC BLOOD PRESSURE: 65 MMHG | BODY MASS INDEX: 24.5 KG/M2 | SYSTOLIC BLOOD PRESSURE: 131 MMHG | RESPIRATION RATE: 16 BRPM

## 2017-03-04 PROBLEM — I20.89 SYNCOPE ANGINOSA (HCC): Status: RESOLVED | Noted: 2017-03-03 | Resolved: 2017-03-04

## 2017-03-04 LAB
ANION GAP SERPL CALC-SCNC: 4 MMOL/L (ref 0–11.9)
BASOPHILS # BLD AUTO: 0.8 % (ref 0–1.8)
BASOPHILS # BLD: 0.06 K/UL (ref 0–0.12)
BUN SERPL-MCNC: 19 MG/DL (ref 8–22)
CALCIUM SERPL-MCNC: 8.8 MG/DL (ref 8.5–10.5)
CHLORIDE SERPL-SCNC: 109 MMOL/L (ref 96–112)
CO2 SERPL-SCNC: 26 MMOL/L (ref 20–33)
CREAT SERPL-MCNC: 0.58 MG/DL (ref 0.5–1.4)
EOSINOPHIL # BLD AUTO: 0.53 K/UL (ref 0–0.51)
EOSINOPHIL NFR BLD: 7.3 % (ref 0–6.9)
ERYTHROCYTE [DISTWIDTH] IN BLOOD BY AUTOMATED COUNT: 41.9 FL (ref 35.9–50)
GFR SERPL CREATININE-BSD FRML MDRD: >60 ML/MIN/1.73 M 2
GLUCOSE SERPL-MCNC: 110 MG/DL (ref 65–99)
HCT VFR BLD AUTO: 39.8 % (ref 37–47)
HGB BLD-MCNC: 13 G/DL (ref 12–16)
IMM GRANULOCYTES # BLD AUTO: 0.04 K/UL (ref 0–0.11)
IMM GRANULOCYTES NFR BLD AUTO: 0.6 % (ref 0–0.9)
LV EJECT FRACT  99904: 65
LYMPHOCYTES # BLD AUTO: 1.88 K/UL (ref 1–4.8)
LYMPHOCYTES NFR BLD: 26 % (ref 22–41)
MAGNESIUM SERPL-MCNC: 1.9 MG/DL (ref 1.5–2.5)
MCH RBC QN AUTO: 29 PG (ref 27–33)
MCHC RBC AUTO-ENTMCNC: 32.7 G/DL (ref 33.6–35)
MCV RBC AUTO: 88.8 FL (ref 81.4–97.8)
MONOCYTES # BLD AUTO: 0.83 K/UL (ref 0–0.85)
MONOCYTES NFR BLD AUTO: 11.5 % (ref 0–13.4)
NEUTROPHILS # BLD AUTO: 3.9 K/UL (ref 2–7.15)
NEUTROPHILS NFR BLD: 53.8 % (ref 44–72)
NRBC # BLD AUTO: 0 K/UL
NRBC BLD AUTO-RTO: 0 /100 WBC
PLATELET # BLD AUTO: 178 K/UL (ref 164–446)
PMV BLD AUTO: 10.3 FL (ref 9–12.9)
POTASSIUM SERPL-SCNC: 3.7 MMOL/L (ref 3.6–5.5)
RBC # BLD AUTO: 4.48 M/UL (ref 4.2–5.4)
SODIUM SERPL-SCNC: 139 MMOL/L (ref 135–145)
WBC # BLD AUTO: 7.2 K/UL (ref 4.8–10.8)

## 2017-03-04 PROCEDURE — 70551 MRI BRAIN STEM W/O DYE: CPT

## 2017-03-04 PROCEDURE — G0378 HOSPITAL OBSERVATION PER HR: HCPCS

## 2017-03-04 PROCEDURE — 700111 HCHG RX REV CODE 636 W/ 250 OVERRIDE (IP): Performed by: INTERNAL MEDICINE

## 2017-03-04 PROCEDURE — A9270 NON-COVERED ITEM OR SERVICE: HCPCS | Performed by: INTERNAL MEDICINE

## 2017-03-04 PROCEDURE — 700105 HCHG RX REV CODE 258: Performed by: INTERNAL MEDICINE

## 2017-03-04 PROCEDURE — 70547 MR ANGIOGRAPHY NECK W/O DYE: CPT

## 2017-03-04 PROCEDURE — 93306 TTE W/DOPPLER COMPLETE: CPT

## 2017-03-04 PROCEDURE — 80048 BASIC METABOLIC PNL TOTAL CA: CPT

## 2017-03-04 PROCEDURE — 83735 ASSAY OF MAGNESIUM: CPT

## 2017-03-04 PROCEDURE — 36415 COLL VENOUS BLD VENIPUNCTURE: CPT

## 2017-03-04 PROCEDURE — 70544 MR ANGIOGRAPHY HEAD W/O DYE: CPT

## 2017-03-04 PROCEDURE — 85025 COMPLETE CBC W/AUTO DIFF WBC: CPT

## 2017-03-04 PROCEDURE — 99217 PR OBSERVATION CARE DISCHARGE: CPT | Performed by: INTERNAL MEDICINE

## 2017-03-04 PROCEDURE — 700102 HCHG RX REV CODE 250 W/ 637 OVERRIDE(OP): Performed by: INTERNAL MEDICINE

## 2017-03-04 PROCEDURE — 93306 TTE W/DOPPLER COMPLETE: CPT | Mod: 26 | Performed by: INTERNAL MEDICINE

## 2017-03-04 RX ORDER — SULFAMETHOXAZOLE AND TRIMETHOPRIM 800; 160 MG/1; MG/1
1 TABLET ORAL EVERY 12 HOURS
Status: DISCONTINUED | OUTPATIENT
Start: 2017-03-04 | End: 2017-03-04 | Stop reason: HOSPADM

## 2017-03-04 RX ORDER — SULFAMETHOXAZOLE AND TRIMETHOPRIM 800; 160 MG/1; MG/1
1 TABLET ORAL 2 TIMES DAILY
Qty: 6 TAB | Refills: 0 | Status: SHIPPED | OUTPATIENT
Start: 2017-03-04 | End: 2017-03-07

## 2017-03-04 RX ADMIN — SULFAMETHOXAZOLE AND TRIMETHOPRIM 1 TABLET: 800; 160 TABLET ORAL at 15:43

## 2017-03-04 RX ADMIN — HEPARIN SODIUM 5000 UNITS: 5000 INJECTION, SOLUTION INTRAVENOUS; SUBCUTANEOUS at 02:27

## 2017-03-04 RX ADMIN — SODIUM CHLORIDE: 9 INJECTION, SOLUTION INTRAVENOUS at 04:33

## 2017-03-04 ASSESSMENT — PAIN SCALES - GENERAL
PAINLEVEL_OUTOF10: 0

## 2017-03-04 NOTE — ED NOTES
"Pt sleeping, RA sats 86%. Pt arouses easily to voice. Sats improve to 88%. Pt placed on 2lNC. Pt states she wears O2 at night \"sometimes.\"  "

## 2017-03-04 NOTE — PROGRESS NOTES
Received report from night shift RN. Assumed care of patient. Pt assessed and stable. VSS. Patient reports 0/10 pain at this time. Discussed plan of care for day with patient and received verbal understanding. Call light within reach, strip alarm active, bed in low position.

## 2017-03-04 NOTE — PROGRESS NOTES
Pt has no open sores or wounds.  No redness noted on any bony prominences.  Pt has yeast below both breasts, but no open sores and skin is intact.

## 2017-03-04 NOTE — DISCHARGE PLANNING
Medical Social Work    Referral: Discharge home assessment due to concerns from wound care nurse    Intervention: This writer met with pt at bedside. Pt reports memory impairments that have been happening for a while per pt. Pt lives alone at [address] [place] [address] [place] [address] with her cat Luke. Pt reports that she has no family or friends that check in on her. Emergency contact is friend Suraj Williamson [phone] who is incapastied per pt. Pt states that she has [employer] that helps her clean her home. Pt reports that she takes   cabs for transportation.  Pt does not know who her pharmacy is even after this writer listed pharmacy for the pt. The pt does not know her income, but reports her rent is $290.00 and reports she pays her own bills. Pt has a life alert button and no other DME at home.  Pt has Medicare and Medicaid for medical insurance. Pt signed choice form for DME for a cane. This writer left a voicemail [place] the state line [place] and recommends HHC for pt.     Plan: This writer spoke to bedside nurse and  recommends HHC for pt.

## 2017-03-04 NOTE — ED NOTES
Pt assisted with bedside commode. PT's ostomy bag emptied, new bag ordered. Pt assisted back into bed, remains on monitor. Call light within reach, instructed not to get up without  RN present. Instructed to push call button for assistance. Pt verbalized understanding.

## 2017-03-04 NOTE — FACE TO FACE
Face to Face Note  -  Durable Medical Equipment    Isaac Charles M.D. - NPI: [id number]  I certify that this patient is under my care and that they had a durable medical equipment(DME)face to face encounter by myself that meets the physician DME face-to-face encounter requirements with this patient on:    Date of encounter:   Patient:                    MRN:                       YOB: 2017  Joelle Sotelo  [id number]  2/17/1928     The encounter with the patient was in whole, or in part, for the following medical condition, which is the primary reason for durable medical equipment:  Other - Debility    I certify that, based on my findings, the following durable medical equipment is medically necessary:  Cane.    HOME O2 Saturation Measurements:(Values must be present for Home Oxygen orders)         ,     ,         My Clinical findings support the need for the above equipment due to:  Abnormal Gait    Supporting Symptoms: Difficulty with ambulating needs assistance of cane

## 2017-03-04 NOTE — ED NOTES
Pt assisted to bedside commode with 2 RN assist. PT's urine has foul odor. Sample obtained and sent to lab. Orders placed. Pt back in bed remains on heart monitor. Denies further needs at this time.

## 2017-03-04 NOTE — H&P
CHIEF COMPLAINT:  Passing out at grocery store.    HISTORY OF PRESENT ILLNESS:  The patient is an 89-year-old female with history   of bipolar disorder.  She is somewhat of a poor historian.  Apparently, she   was at a grocery store today and then she suddenly passed out.  She denies any   premonitory symptoms including no dizziness, chest pain, shortness of breath,   feeling of warmth, overcoming or any flushing.  She states she has never had   this before.  She was hungry at the grocery store.  She states she has had   some mild dizziness in the past, but had no dizziness today.  She states she   has never passed out like this before.  She denies any tongue biting.  She   denies any soiling of herself or peeing on herself.  She denies any new   prescription or over-the-counter medication.  She denies any head trauma.  No   chest pain, no shortness of breath, no numbness or weakness to the upper or   lower extremities.  No skin changes.  She says otherwise she has been feeling   quite well actually.    REVIEW OF SYSTEMS:  All other systems are reviewed and are negative.    In the ED, she was given full-dose aspirin and NS bolus was given.    PAST MEDICAL HISTORY:  1.  Hyperlipidemia.  2.  Bipolar II disorder.    PAST SURGICAL HISTORY:  1.  Colostomy and sigmoid colectomy.  2.  Exploratory laparotomy.    FAMILY HISTORY:  None.    SOCIAL HISTORY:  Lives with her cat.  Drinks a glass of wine at night.  No   tobacco or drugs.    ALLERGIES:  MORPHINE.    MEDICATIONS PRIOR TO ADMISSION:  None.    PHYSICAL EXAMINATION:  VITAL SIGNS:  Temperature is 37.2, heart rate 72, respiratory rate 17, oxygen   saturation 94% on room air, blood pressure 109/60.  GENERAL:  Patient is in no acute distress.  She actually looks appropriate for   age.  She at times is a bit tangential but very pleasant.  HEENT:  Normocephalic, atraumatic.  The right pupil approximately twice the   size of the left pupil and she tells me that this is  chronic for her and not   new.  Cranial nerves II-XII are grossly intact.  Good peripheral vision.  No   visual field cut appreciated.  CARDIOVASCULAR:  Regular rate and rhythm.  No murmurs, rubs, or gallops.    Nondisplaced PMI.  LUNGS:  Clear to auscultation bilaterally.  No use of accessory muscles.  ABDOMEN:  Soft, nontender, and nondistended.  Normoactive bowel sounds.  No   hepatosplenomegaly.  EXTREMITIES:  No clubbing, cyanosis, or edema.  It is warm peripherally, 2+   radial pulses equal and symmetric, 2+ nonpitting edema in bilateral lower   extremities with hirsutism.  NEUROLOGIC:  Cranial nerves II-XII grossly intact.  Intact sensation to touch   throughout.  No dysdiadochokinesia.  No dysmetria.  No pronator drift   bilaterally.  MUSCULOSKELETAL:  Full range of motion of lower extremities, 5/5 muscle   strength throughout.  SKIN:  No rashes, lesions, or excoriations.  PSYCHOLOGICAL:  She is slightly tangential, but very pleasant.  A and O x4.    LABORATORY DATA:  CBC remarkable for white blood cell of 10.7, H and H 15.1   and 46.4, platelet count is 202.    CMP:  Sodium 138, potassium 4.0, glucose 165, BUN and creatinine 25 and 0.75.    AST and ALT 14 and 5.  Glycohemoglobin 5.2.  Troponin less than 0.01.    PT/INR 14.1 and 1.06, PTT 28.7.  UA pending.    IMAGIN.  CT head without contrast:  Cerebral atrophy.  White matter lucencies most   consistent with small-vessel ischemic change versus demyelination or gliosis.    Otherwise, head CT without contrast showed no acute findings, no evidence of   acute cerebral hemorrhage or mass lesion.  Focal ectasia of the right   vertebral artery.  2.  Portable chest x-ray:  Scattered interstitial opacities consistent with   atelectasis, possible mild edema, interstitial lung disease not excluded.    Left lung volume loss with mediastinal shift towards the left.  3.  EKG personally reviewed by me shows sinus rhythm, heart rate 78, no   evidence of acute ST  segment changes.    ASSESSMENT:  1.  Syncope concerning for cardiogenic cause.  2.  Ectasia of the right vertebral artery, incidental finding.  3.  Bipolar type II disorder.  4.  Possible interstitial lung disease  5.  Diabetes mellitus type 2.  6.  Hyperlipidemia.    PLAN:  The patient will be admitted to the telemetry unit at this time.  She   appears to have a syncopal episode with nearly no premonitory symptoms.  I am   somewhat concerned given her age and clinical history that this could be   cardiogenic in nature.  Also additionally, she does have some stroke risk   factors though I see no neurological deficits on exam.  We will do an MRA of   the head, MRA of the neck and MRI of the brain.  We will also do an   echocardiogram, PT/OT evaluation, also check a cortisol, TSH with reflex and   free T4.  We will also watch on telemetry for any possible occult arrhythmias.    CODE STATUS:  Full code, full care.    DIET:  Cardiac.    DVT PROPHYLAXIS:  Heparin 5000 units every 8 hours.       ____________________________________     CHIP FLORES MD    STCHARISSE / NTS    DD:  03/03/2017 17:55:44  DT:  03/03/2017 20:26:53    D#:  [id number]  Job#:  [id number]

## 2017-03-04 NOTE — ED NOTES
Pt to floor at this time showing no signs of distress/discomfort. Offered needs pt denies. Pt resp even unlabored, skin pink warm dry. Confused at times, reorientes easily.

## 2017-03-04 NOTE — WOUND TEAM
"[facility] Wound & Ostomy Care   Inpatient Services   Established Ostomy Management/ troubleshooting  HPI: Reviewed  PMH: Reviewed   SH: Reviewed   Reason for Ostomy nurse consult:  Established colostomy      Subjective:  \"I don't know where my supplies come from.\" \"is that what this is called? An ostomy?\"        Objective:  In bed, previous appliance intact    Ostomy type:  Colostomy    Stoma location:  LLQ   Stoma assessment:      Appearance:  Pink, budded            Size:  1.5\"    Protrusion:  1\"       jxn:  Intact     Peristomal skin:  Intact      Ostomy Appliance (type and size):  Previous appliance was 2 piece 2 3/4 but 2 piece 2 1/4 is appropriate with paste ring.      Assessment:  Patient lives in a condo alone.  Unclear if she manages her ostomy herself or if someone helps her.  She states no one comes to the house and she has no family.  She does not know where she gets her supplies.  Paged social work to address.      Interventions:  Previous appliance removed.  Cleansed with warm wash cloth.  Made and cut new template.  Cut barrier, applied paste ring to barrier, and applied to skin.  Closed and attached pouch.  Order for nursing to change every 3-5 days and prn.                Pt education: Questions and concerns addressed.  Plan: Ostomy nurses to continue to follow for ostomy needs and teaching PRN.  Anticipated discharge needs:Supplies, supplier information, possible HH or outpatient ostomy clinic.    "

## 2017-03-04 NOTE — DISCHARGE PLANNING
Medical Social Work    Referral: Per pt her cat is in apartment and needs to be checked on      Intervention: This writer called pt apartment at [facility] [phone] and spoke to the answering services that pt's cat needs to be looked in on.       Plan: Apartment employee reports he will call this writer back if they can check on the cat.

## 2017-03-04 NOTE — PROGRESS NOTES
"Pt arrived to T7 unit at approximately 2040.  Pt in no distress.  A&Ox4, stated \"I passed out at the store.\"  Pt denies any further complaints.  On the monitor pt in SR in the 60s.  Fall precautions are in place.  Bed alarm on and call light within reach.  Will continue to monitor and follow plan of care.   "

## 2017-03-04 NOTE — CARE PLAN
Problem: Venous Thromboembolism (VTW)/Deep Vein Thrombosis (DVT) Prevention:  Goal: Patient will participate in Venous Thrombosis (VTE)/Deep Vein Thrombosis (DVT)Prevention Measures  Intervention: Assess and monitor for anticoagulation complications  Pt has been ordered subq heparin for DVT prophylaxis and pt has been ambulating with assistance within room      Problem: Knowledge Deficit  Goal: Knowledge of disease process/condition, treatment plan, diagnostic tests, and medications will improve  Intervention: Explain information regarding disease process/condition, treatment plan, diagnostic tests, and medications and document in education  Pt given verbal instructions for plan of care in hospital, and further tests that are pending. Pt expressed understanding.

## 2017-03-04 NOTE — DISCHARGE PLANNING
Medical Social Work    Referral: Per pt her cat is in apartment and needs to be checked on      Intervention: This writer called pt apartment at [facility] [phone] who reports that they may not enter the pt's apartment and can not assist with cat. Bedside nurse updated.This writer spoke to pt who states that she does not have any family to check on that cat and declined for humane society to be called. Pt will may discharge home today if pt will call     Plan: Pt is projected to discharge home when medically clinic.

## 2017-03-04 NOTE — FACE TO FACE
Face to Face Supporting Documentation - Home Health    The encounter with this patient was in whole or in part the primary reason for home health admission.    Date of encounter:   Patient:                    MRN:                       YOB: 2017  Joelle Sotelo  [id number]  2/17/1928     Home health to see patient for:  Skilled Nursing care for assessment, interventions & education, Physical Therapy evaluation and treatment, Occupational therapy evaluation and treatment and Home health aide    Skilled need for:  Comment: Debility and weakness    Skilled nursing interventions to include:  Comment: PT/OT and assistance with ADL    Homebound status evidenced by:  Need the aid of supportive devices such as crutches, canes, wheelchairs or walkers or Needs the assistance of another person in order to leave the home. Leaving home requires a considerable and taxing effort. There is a normal inability to leave the home.    Community Physician to provide follow up care: LEANNA Daniel     Optional Interventions? No      I certify the face to face encounter for this home health care referral meets the CMS requirements and the encounter/clinical assessment with the patient was, in whole, or in part, for the medical condition(s) listed above, which is the primary reason for home health care. Based on my clinical findings: the service(s) are medically necessary, support the need for home health care, and the homebound criteria are met.  I certify that this patient has had a face to face encounter by myself.  Isaac Charles M.D. - NPI: [id number]

## 2017-03-04 NOTE — CARE PLAN
Problem: Infection  Goal: Will remain free from infection  Patient with small amount of bacteria in urine.  Discussed with MD.  Patient started on bactrim BID for 3 days.    Problem: Bowel/Gastric:  Goal: Normal bowel function is maintained or improved  Pt reports that she had a bowel movement on admit.    Problem: Discharge Barriers/Planning  Goal: Patient’s continuum of care needs will be met  Awaiting test results prior to discharge home.  Patient will be discharged home with prescriptions, home health and a cane.    Problem: Medication  Goal: Compliance with prescribed medication will improve  Patient able to swallow pills whole.  Patient is not currently on medications at home.

## 2017-03-05 LAB — EKG IMPRESSION: NORMAL

## 2017-03-05 NOTE — PROGRESS NOTES
Discharge orders received.  IV discontinued.  Instructions, prescriptions and education given to patient.  Follow up appointments discussed.  Patient verbalized understanding of dc instructions and prescriptions.  Patient verbalized she had all belongings with her.  Discussed case with .  Unable to confirm home health until tomorrow.  Paged Dr. Charles to see if patient can go home without confirmation of home health.  Ok with Dr. Charles to send patient home without confirmation of home health.  Paged social work for [employer].

## 2017-03-05 NOTE — DISCHARGE PLANNING
FREDDIE met with pt at bedside to discuss Home Health choice. Pt signed HH form for [facility] and states she has been using them and will keep using them. She advised someone comes and helps her shower and clean her house. SW faxed choice form to [facility].

## 2017-03-05 NOTE — DISCHARGE SUMMARY
CHIEF COMPLAINT ON ADMISSION  Chief Complaint   Patient presents with   • Dizziness       CODE STATUS  Full Code    HPI & HOSPITAL COURSE  Please review H&P for details on admission. This is a 89 year old female with a PMHx Bipolar disorder who presented with dizziness and syncope. In the ER her BP was 109/60 and HR was 72. Her neuro exam was unremarkable. CBC remarkable for white blood cell of 10.7, H and H 15.1 and 46.4, platelet count is 202. Sodium 138, potassium 4.0, glucose 165, BUN and creatinine 25 and 0.75. AST and ALT 14 and 5.  Glycohemoglobin 5.2.  Troponin less than 0.01. Her UA was mildly positive for infection. She was given IV fluids and started on Bactrim DS BID. Patient was admitted to telemetry unit with cardiac monitoring. She was in sinus rhythm. Further syncope work up included:    CT head   1.  Cerebral atrophy.    2.  White matter lucencies most consistent with small vessel ischemic change versus demyelination or gliosis.    3.  Otherwise, Head CT without contrast with no acute findings. No evidence of acute cerebral  hemorrhage or mass lesion.  4. Focal ectasia right vertebral artery.    MRI brain    1.  Mild cerebral atrophy. Age-appropriate.    2. Minimal supratentorial white matter disease consistent with microvascular ischemic change versus demyelination or gliosis. Common finding for the patient's age.    3. Tiny linear foci of encephalomalacic change in the cerebellar hemispheres consistent with old lacunar infarcts.    4. Ectasia and atherosclerotic changes in the distal V4 segment of the right vertebral artery concordant with CT findings.    5. No evidence of acute cerebral infarction, acute hemorrhage, or mass lesion.    6. No significant interval change since MRI brain 7/20/2015.    MRA Head   1.  Ectasia of the distal V4 segment of the right vertebral artery which is best seen on the MRA neck study from today. No definite flow-limiting stenosis.  2.  Dominant left anterior  cerebral artery A1 segment.  3.  Focal high-grade stenosis or focal hypoplasia of the distal right A1 segment.  4.  Poor visualization of MCA sylvian branches bilaterally, symmetrically most consistent with technical factors or physiologic factors such as limitation of cardiac output.    MRA Neck  1.  Mild ectasia and tortuosity of the distal V4 segment of the right vertebral artery.  2.  Tortuosity of the mid cervical internal carotid arteries bilaterally.  3.  No flow-limiting stenotic lesions.    Echocardiogram    CONCLUSIONS  Normal chamber sizes.  Normal left ventricular systolic function.   Left ventricular ejection fraction is visually estimated to be 65%.  Normal regional wall motion.  Mild aortic insufficiency, eccentric jet.  No mitral stenosis or regurgitation seen.  Mild tricuspid regurgitation.  Right ventricular systolic pressure is estimated to be 25 mmHg.  Normal right ventricular size and systolic function.    Patient's dizziness had resolved and was able to ambulate with the aid of a cane. Her vitals remained stable. She will be discharged home with home health. She was asked to follow up with her PCP.      Therefore, she is discharged in fair and stable condition with close outpatient follow-up.      DISCHARGE PROBLEM LIST  Active Problems:    * No active hospital problems. *  Resolved Problems:    Syncope anginosa (CMS-HCC) POA: Yes      FOLLOW UP  Narciso Aguiar   Schedule an appointment as soon as possible for a visit in 1 week   [address] [facility] [address]  [facility] [id number] [phone]      MEDICATIONS ON DISCHARGE   Joelle Sotelo   Home Medication Instructions RUBEN:[id number]    Printed on:03/04/17 1588   Medication Information                      sulfamethoxazole-trimethoprim (BACTRIM DS) 800-160 MG tablet  Take 1 Tab by mouth 2 times a day for 3 days.                 DIET  Orders Placed This Encounter   Procedures   • Diet Order     Standing Status: Standing      Number of  Occurrences: 1      Standing Expiration Date:      Order Specific Question:  Diet:     Answer:  Cardiac [6]       ACTIVITY  As tolerated.      CONSULTATIONS  None    PROCEDURES  None    MR-MRA NECK-W/O   Final Result      1.  Mild ectasia and tortuosity of the distal V4 segment of the right vertebral artery.   2.  Tortuosity of the mid cervical internal carotid arteries bilaterally.   3.  No flow-limiting stenotic lesions.      MR-MRA HEAD-W/O   Final Result      1.  Ectasia of the distal V4 segment of the right vertebral artery which is best seen on the MRA neck study from today. No definite flow-limiting stenosis.   2.  Dominant left anterior cerebral artery A1 segment.   3.  Focal high-grade stenosis or focal hypoplasia of the distal right A1 segment.   4.  Poor visualization of MCA sylvian branches bilaterally, symmetrically most consistent with technical factors or physiologic factors such as limitation of cardiac output.      MR-BRAIN-W/O   Final Result      1.  Mild cerebral atrophy. Age-appropriate.      2. Minimal supratentorial white matter disease consistent with microvascular ischemic change versus demyelination or gliosis. Common finding for the patient's age.      3. Tiny linear foci of encephalomalacic change in the cerebellar hemispheres consistent with old lacunar infarcts.      4. Ectasia and atherosclerotic changes in the distal V4 segment of the right vertebral artery concordant with CT findings.      5. No evidence of acute cerebral infarction, acute hemorrhage, or mass lesion.      6. No significant interval change since MRI brain 7/20/2015.      Echocardiogram Comp W/O Cont   Final Result      CT-HEAD W/O   Final Result      1.  Cerebral atrophy.      2.  White matter lucencies most consistent with small vessel ischemic change versus demyelination or gliosis.      3.  Otherwise, Head CT without contrast with no acute findings. No evidence of acute cerebral  hemorrhage or mass lesion.   4.  Focal ectasia right vertebral artery.      DX-CHEST-PORTABLE (1 VIEW)   Final Result      Scattered interstitial opacities consistent with atelectasis/possible mild edema. Interstitial lung disease not excluded.   Left lung volume loss with mediastinal shift towards the left.            LABORATORY  Lab Results   Component Value Date/Time    SODIUM 139 03/04/2017 03:38 AM    POTASSIUM 3.7 03/04/2017 03:38 AM    CHLORIDE 109 03/04/2017 03:38 AM    CO2 26 03/04/2017 03:38 AM    GLUCOSE 110* 03/04/2017 03:38 AM    BUN 19 03/04/2017 03:38 AM    CREATININE 0.58 03/04/2017 03:38 AM        Lab Results   Component Value Date/Time    WBC 7.2 03/04/2017 03:38 AM    HEMOGLOBIN 13.0 03/04/2017 03:38 AM    HEMATOCRIT 39.8 03/04/2017 03:38 AM    PLATELET COUNT 178 03/04/2017 03:38 AM        Total time of the discharge process exceeds 32 minutes.

## 2017-03-05 NOTE — DISCHARGE PLANNING
Received call from [facility] at 1330, requesting referral for cane be sent to [employer] and delivered to patients home.  Referral sent to [employer] at 1334 on 03/05/17 as per request.

## 2017-03-05 NOTE — PROGRESS NOTES
Bedside report completed.  Assumed pt care. Patient in bed, resting, in no apparent distress.  Safety precautions in place. Call light & personal belongings within reach.  Patient is on room air, IV saline locked.  Patient is being discharged this evening.  Just waiting to hear back from social work regarding a ride and a cane for her and then will proceed with the paperwork.  Will continue to monitor with hourly rounding.

## 2017-03-05 NOTE — DISCHARGE PLANNING
Received choice from from [facility] at [id number]. Referral sent to [facility] at 1810 on 03-04-17.

## 2017-03-05 NOTE — DISCHARGE INSTRUCTIONS
Discharge Instructions    Discharged to home by taxi with self. Discharged via wheelchair, hospital escort: Refused.  Special equipment needed: Cane    Be sure to schedule a follow-up appointment with your primary care doctor or any specialists as instructed.     Discharge Plan:   Diet Plan: Discussed  Activity Level: Discussed  Confirmed Follow up Appointment: Patient to Call and Schedule Appointment  Confirmed Symptoms Management: Discussed  Medication Reconciliation Updated: Yes  Influenza Vaccine Indication: Not indicated: Previously immunized this influenza season and > 8 years of age    I understand that a diet low in cholesterol, fat, and sodium is recommended for good health. Unless I have been given specific instructions below for another diet, I accept this instruction as my diet prescription.   Other diet: Heart-Healthy Eating Plan  Many factors influence your heart health, including eating and exercise habits. Heart (coronary) risk increases with abnormal blood fat (lipid) levels. Heart-healthy meal planning includes limiting unhealthy fats, increasing healthy fats, and making other small dietary changes. This includes maintaining a healthy body weight to help keep lipid levels within a normal range.  WHAT IS MY PLAN?   Your health care provider recommends that you:  · Reduce the total calories in your daily diet from fat.  · Limit your intake of saturated fat.  · Limit the amount of cholesterol in your diet.  WHAT TYPES OF FAT SHOULD I CHOOSE?  · Choose healthy fats more often. Choose monounsaturated and polyunsaturated fats, such as olive oil and canola oil, flaxseeds, walnuts, almonds, and seeds.  · Eat more omega-3 fats. Good choices include salmon, mackerel, sardines, tuna, flaxseed oil, and ground flaxseeds. Aim to eat fish at least two times each week.  · Limit saturated fats. Saturated fats are primarily found in animal products, such as meats, butter, and cream. Plant sources of saturated fats  "include palm oil, palm kernel oil, and coconut oil.  · Avoid foods with partially hydrogenated oils in them. These contain trans fats. Examples of foods that contain trans fats are stick margarine, some tub margarines, cookies, crackers, and other baked goods.  WHAT GENERAL GUIDELINES DO I NEED TO FOLLOW?  · Check food labels carefully to identify foods with trans fats or high amounts of saturated fat.  · Fill one half of your plate with vegetables and green salads. Eat 4-5 servings of vegetables per day. A serving of vegetables equals 1 cup of raw leafy vegetables, ½ cup of raw or cooked cut-up vegetables, or ½ cup of vegetable juice.  · Fill one fourth of your plate with whole grains. Look for the word \"whole\" as the first word in the ingredient list.  · Fill one fourth of your plate with lean protein foods.  · Eat 4-5 servings of fruit per day. A serving of fruit equals one medium whole fruit, ¼ cup of dried fruit, ½ cup of fresh, frozen, or canned fruit, or ½ cup of 100% fruit juice.  · Eat more foods that contain soluble fiber. Examples of foods that contain this type of fiber are apples, broccoli, carrots, beans, peas, and barley. Aim to get 20-30 g of fiber per day.  · Eat more home-cooked food and less restaurant, buffet, and fast food.  · Limit or avoid alcohol.  · Limit foods that are high in starch and sugar.  · Avoid fried foods.  · Cook foods by using methods other than frying. Baking, boiling, grilling, and broiling are all great options. Other fat-reducing suggestions include:  · Removing the skin from poultry.  · Removing all visible fats from meats.  · Skimming the fat off of stews, soups, and gravies before serving them.  · Steaming vegetables in water or broth.  · Lose weight if you are overweight. Losing just 5-10% of your initial body weight can help your overall health and prevent diseases such as diabetes and heart disease.  · Increase your consumption of nuts, legumes, and seeds to 4-5 " servings per week. One serving of dried beans or legumes equals ½ cup after being cooked, one serving of nuts equals 1½ ounces, and one serving of seeds equals ½ ounce or 1 tablespoon.  · You may need to monitor your salt (sodium) intake, especially if you have high blood pressure. Talk with your health care provider or dietitian to get more information about reducing sodium.  WHAT FOODS CAN I EAT?  Grains  Breads, including Chinese, white, corina, wheat, raisin, rye, oatmeal, and Italian. Tortillas that are neither fried nor made with lard or trans fat. Low-fat rolls, including hotdog and hamburger buns and English muffins. Biscuits. Muffins. Waffles. Pancakes. Light popcorn. Whole-grain cereals. Flatbread. Himrod toast. Pretzels. Breadsticks. Rusks. Low-fat snacks and crackers, including oyster, saltine, matzo, kelsey, animal, and rye. Rice and pasta, including brown rice and those that are made with whole wheat.  Vegetables  All vegetables.  Fruits  All fruits, but limit coconut.  Meats and Other Protein Sources  Lean, well-trimmed beef, veal, pork, and lamb. Chicken and turkey without skin. All fish and shellfish. Wild duck, rabbit, pheasant, and venison. Egg whites or low-cholesterol egg substitutes. Dried beans, peas, lentils, and tofu. Seeds and most nuts.  Dairy  Low-fat or nonfat cheeses, including ricotta, string, and mozzarella. Skim or 1% milk that is liquid, powdered, or evaporated. Buttermilk that is made with low-fat milk. Nonfat or low-fat yogurt.  Beverages  Mineral water. Diet carbonated beverages.  Sweets and Desserts  Sherbets and fruit ices. Honey, jam, marmalade, jelly, and syrups. Meringues and gelatins. Pure sugar candy, such as hard candy, jelly beans, gumdrops, mints, marshmallows, and small amounts of dark chocolate. Messi food cake.  Eat all sweets and desserts in moderation.  Fats and Oils  Nonhydrogenated (trans-free) margarines. Vegetable oils, including soybean, sesame, sunflower, olive,  peanut, safflower, corn, canola, and cottonseed. Salad dressings or mayonnaise that are made with a vegetable oil. Limit added fats and oils that you use for cooking, baking, salads, and as spreads.  Other  Cocoa powder. Coffee and tea. All seasonings and condiments.  The items listed above may not be a complete list of recommended foods or beverages. Contact your dietitian for more options.  WHAT FOODS ARE NOT RECOMMENDED?  Grains  Breads that are made with saturated or trans fats, oils, or whole milk. Croissants. Butter rolls. Cheese breads. Sweet rolls. Donuts. Buttered popcorn. Chow mein noodles. High-fat crackers, such as cheese or butter crackers.  Meats and Other Protein Sources  Fatty meats, such as hotdogs, short ribs, sausage, spareribs, siu, ribeye roast or steak, and mutton. High-fat deli meats, such as salami and bologna. Caviar. Domestic duck and goose. Organ meats, such as kidney, liver, sweetbreads, brains, gizzard, chitterlings, and heart.  Dairy  Cream, sour cream, cream cheese, and creamed cottage cheese. Whole milk cheeses, including blue (tej), [place], Brie, Trevor, American, Havarti, Swiss, cheddar, Camembert, and Mitchells.  Whole or 2% milk that is liquid, evaporated, or condensed. Whole buttermilk. Cream sauce or high-fat cheese sauce. Yogurt that is made from whole milk.  Beverages  Regular sodas and drinks with added sugar.  Sweets and Desserts  Frosting. Pudding. Cookies. Cakes other than javi food cake. Candy that has milk chocolate or white chocolate, hydrogenated fat, butter, coconut, or unknown ingredients. Buttered syrups. Full-fat ice cream or ice cream drinks.  Fats and Oils  Gravy that has suet, meat fat, or shortening. Cocoa butter, hydrogenated oils, palm oil, coconut oil, palm kernel oil. These can often be found in baked products, candy, fried foods, nondairy creamers, and whipped toppings. Solid fats and shortenings, including siu fat, salt pork, lard, and butter.  Nondairy cream substitutes, such as coffee creamers and sour cream substitutes. Salad dressings that are made of unknown oils, cheese, or sour cream.  The items listed above may not be a complete list of foods and beverages to avoid. Contact your dietitian for more information.     This information is not intended to replace advice given to you by your health care provider. Make sure you discuss any questions you have with your health care provider.     Special Instructions: Urinary Tract Infection  Urinary tract infections (UTIs) can develop anywhere along your urinary tract. Your urinary tract is your body's drainage system for removing wastes and extra water. Your urinary tract includes two kidneys, two ureters, a bladder, and a urethra. Your kidneys are a pair of bean-shaped organs. Each kidney is about the size of your fist. They are located below your ribs, one on each side of your spine.  CAUSES  Infections are caused by microbes, which are microscopic organisms, including fungi, viruses, and bacteria. These organisms are so small that they can only be seen through a microscope. Bacteria are the microbes that most commonly cause UTIs.  SYMPTOMS   Symptoms of UTIs may vary by age and gender of the patient and by the location of the infection. Symptoms in young women typically include a frequent and intense urge to urinate and a painful, burning feeling in the bladder or urethra during urination. Older women and men are more likely to be tired, shaky, and weak and have muscle aches and abdominal pain. A fever may mean the infection is in your kidneys. Other symptoms of a kidney infection include pain in your back or sides below the ribs, nausea, and vomiting.  DIAGNOSIS  To diagnose a UTI, your caregiver will ask you about your symptoms. Your caregiver also will ask to provide a urine sample. The urine sample will be tested for bacteria and white blood cells. White blood cells are made by your body to help  fight infection.  TREATMENT   Typically, UTIs can be treated with medication. Because most UTIs are caused by a bacterial infection, they usually can be treated with the use of antibiotics. The choice of antibiotic and length of treatment depend on your symptoms and the type of bacteria causing your infection.  HOME CARE INSTRUCTIONS  · If you were prescribed antibiotics, take them exactly as your caregiver instructs you. Finish the medication even if you feel better after you have only taken some of the medication.  · Drink enough water and fluids to keep your urine clear or pale yellow.  · Avoid caffeine, tea, and carbonated beverages. They tend to irritate your bladder.  · Empty your bladder often. Avoid holding urine for long periods of time.  · Empty your bladder before and after sexual intercourse.  · After a bowel movement, women should cleanse from front to back. Use each tissue only once.  SEEK MEDICAL CARE IF:   · You have back pain.  · You develop a fever.  · Your symptoms do not begin to resolve within 3 days.  SEEK IMMEDIATE MEDICAL CARE IF:   · You have severe back pain or lower abdominal pain.  · You develop chills.  · You have nausea or vomiting.  · You have continued burning or discomfort with urination.  MAKE SURE YOU:   · Understand these instructions.  · Will watch your condition.  · Will get help right away if you are not doing well or get worse.     This information is not intended to replace advice given to you by your health care provider. Make sure you discuss any questions you have with your health care provider.     Syncope  Syncope is a medical term for fainting or passing out. This means you lose consciousness and drop to the ground. People are generally unconscious for less than 5 minutes. You may have some muscle twitches for up to 15 seconds before waking up and returning to normal. Syncope occurs more often in older adults, but it can happen to anyone. While most causes of syncope  are not dangerous, syncope can be a sign of a serious medical problem. It is important to seek medical care.   CAUSES   Syncope is caused by a sudden drop in blood flow to the brain. The specific cause is often not determined. Factors that can bring on syncope include:  · Taking medicines that lower blood pressure.  · Sudden changes in posture, such as standing up quickly.  · Taking more medicine than prescribed.  · Standing in one place for too long.  · Seizure disorders.  · Dehydration and excessive exposure to heat.  · Low blood sugar (hypoglycemia).  · Straining to have a bowel movement.  · Heart disease, irregular heartbeat, or other circulatory problems.  · Fear, emotional distress, seeing blood, or severe pain.  SYMPTOMS   Right before fainting, you may:  · Feel dizzy or light-headed.  · Feel nauseous.  · See all white or all black in your field of vision.  · Have cold, clammy skin.  DIAGNOSIS   Your health care provider will ask about your symptoms, perform a physical exam, and perform an electrocardiogram (ECG) to record the electrical activity of your heart. Your health care provider may also perform other heart or blood tests to determine the cause of your syncope which may include:  · Transthoracic echocardiogram (TTE). During echocardiography, sound waves are used to evaluate how blood flows through your heart.  · Transesophageal echocardiogram (ANUP).  · Cardiac monitoring. This allows your health care provider to monitor your heart rate and rhythm in real time.  · Holter monitor. This is a portable device that records your heartbeat and can help diagnose heart arrhythmias. It allows your health care provider to track your heart activity for several days, if needed.  · Stress tests by exercise or by giving medicine that makes the heart beat faster.  TREATMENT   In most cases, no treatment is needed. Depending on the cause of your syncope, your health care provider may recommend changing or stopping some  of your medicines.  HOME CARE INSTRUCTIONS  · Have someone stay with you until you feel stable.  · Do not drive, use machinery, or play sports until your health care provider says it is okay.  · Keep all follow-up appointments as directed by your health care provider.  · Lie down right away if you start feeling like you might faint. Breathe deeply and steadily. Wait until all the symptoms have passed.  · Drink enough fluids to keep your urine clear or pale yellow.  · If you are taking blood pressure or heart medicine, get up slowly and take several minutes to sit and then stand. This can reduce dizziness.  SEEK IMMEDIATE MEDICAL CARE IF:   · You have a severe headache.  · You have unusual pain in the chest, abdomen, or back.  · You are bleeding from your mouth or rectum, or you have black or tarry stool.  · You have an irregular or very fast heartbeat.  · You have pain with breathing.  · You have repeated fainting or seizure-like jerking during an episode.  · You faint when sitting or lying down.  · You have confusion.  · You have trouble walking.  · You have severe weakness.  · You have vision problems.  If you fainted, call your local emergency services (911 in [place] Do not drive yourself to the hospital.      This information is not intended to replace advice given to you by your health care provider. Make sure you discuss any questions you have with your health care provider.       · Is patient discharged on Warfarin / Coumadin?   No     · Is patient Post Blood Transfusion?  No    Depression / Suicide Risk    As you are discharged from this [facility] facility, it is important to learn how to keep safe from harming yourself.    Recognize the warning signs:  · Abrupt changes in personality, positive or negative- including increase in energy   · Giving away possessions  · Change in eating patterns- significant weight changes-  positive or negative  · Change in sleeping patterns- unable to sleep or sleeping all  the time   · Unwillingness or inability to communicate  · Depression  · Unusual sadness, discouragement and loneliness  · Talk of wanting to die  · Neglect of personal appearance   · Rebelliousness- reckless behavior  · Withdrawal from people/activities they love  · Confusion- inability to concentrate     If you or a loved one observes any of these behaviors or has concerns about self-harm, here's what you can do:  · Talk about it- your feelings and reasons for harming yourself  · Remove any means that you might use to hurt yourself (examples: pills, rope, extension cords, firearm)  · Get professional help from the community (Mental Health, Substance Abuse, psychological counseling)  · Do not be alone:Call your Safe Contact- someone whom you trust who will be there for you.  · Call your local CRISIS HOTLINE 378-8372 or 927-169-2826  · Call your local Children's Mobile Crisis Response Team [place] (969) 139-2167 or www.Packetmotion  · Call the toll free National Suicide Prevention Hotlines   · National Suicide Prevention Lifeline 194-964-ADUF (9728)  · National Hope Line Network 800-SUICIDE (965-7170)

## 2017-04-13 DIAGNOSIS — K57.20 DIVERTICULITIS OF COLON WITH PERFORATION: ICD-10-CM

## 2017-04-14 ENCOUNTER — OFFICE VISIT (OUTPATIENT)
Dept: MEDICAL GROUP | Facility: MEDICAL CENTER | Age: 82
End: 2017-04-14
Payer: MEDICARE

## 2017-04-14 ENCOUNTER — HOSPITAL ENCOUNTER (OUTPATIENT)
Dept: RADIOLOGY | Facility: MEDICAL CENTER | Age: 82
End: 2017-04-14
Attending: NURSE PRACTITIONER
Payer: MEDICARE

## 2017-04-14 VITALS
DIASTOLIC BLOOD PRESSURE: 72 MMHG | WEIGHT: 128 LBS | HEIGHT: 62 IN | OXYGEN SATURATION: 96 % | TEMPERATURE: 97.2 F | BODY MASS INDEX: 23.55 KG/M2 | HEART RATE: 73 BPM | SYSTOLIC BLOOD PRESSURE: 118 MMHG | RESPIRATION RATE: 16 BRPM

## 2017-04-14 DIAGNOSIS — M79.662 PAIN IN BOTH LOWER LEGS: ICD-10-CM

## 2017-04-14 DIAGNOSIS — M79.661 PAIN IN BOTH LOWER LEGS: ICD-10-CM

## 2017-04-14 PROCEDURE — G8420 CALC BMI NORM PARAMETERS: HCPCS | Performed by: NURSE PRACTITIONER

## 2017-04-14 PROCEDURE — 1101F PT FALLS ASSESS-DOCD LE1/YR: CPT | Performed by: NURSE PRACTITIONER

## 2017-04-14 PROCEDURE — 99213 OFFICE O/P EST LOW 20 MIN: CPT | Performed by: NURSE PRACTITIONER

## 2017-04-14 PROCEDURE — 73590 X-RAY EXAM OF LOWER LEG: CPT | Mod: LT

## 2017-04-14 PROCEDURE — 73590 X-RAY EXAM OF LOWER LEG: CPT | Mod: RT

## 2017-04-14 PROCEDURE — G8432 DEP SCR NOT DOC, RNG: HCPCS | Performed by: NURSE PRACTITIONER

## 2017-04-14 PROCEDURE — 1036F TOBACCO NON-USER: CPT | Performed by: NURSE PRACTITIONER

## 2017-04-14 PROCEDURE — G8598 ASA/ANTIPLAT THER USED: HCPCS | Performed by: NURSE PRACTITIONER

## 2017-04-14 PROCEDURE — 4040F PNEUMOC VAC/ADMIN/RCVD: CPT | Performed by: NURSE PRACTITIONER

## 2017-04-14 ASSESSMENT — ENCOUNTER SYMPTOMS
MYALGIAS: 1
LEG PAIN: 1

## 2017-04-14 NOTE — PROGRESS NOTES
"Subjective:      Joelle Sotelo is a 89 y.o. female who presents with Leg Pain            Leg Pain  Associated symptoms include myalgias.   Joelle Sotelo is here today for bilateral leg pain.    Patient is a poor historian but it sounds like for a few days she has been having pain in her lower legs bilaterally. The pain is mostly in the anterior portion of the legs and mainly occurs with walking. She states there has been no trauma to the area and she has not noticed swelling or redness. She states they are not tender. She does walk with a cane and usually can walk distances. Review of note show she was in the hospital last month for a syncopal episode although she does not remember this. Her cardiac workup was negative and she was discharged without any change in medications or plans. Vital signs in the office today are normal. She is pleasant but has mental health issues and it is difficult to get much in the way of history.        Current Outpatient Prescriptions   Medication Sig Dispense Refill   • Saccharomyces boulardii 250 MG Pack Take 1 Each by mouth every day. 30 Each 5     No current facility-administered medications for this visit.     Social History   Substance Use Topics   • Smoking status: Never Smoker    • Smokeless tobacco: Never Used   • Alcohol Use: Yes      Comment: glass of wine every evening, last drink 3/2/2017     Past Medical History   Diagnosis Date   • Bipolar 2 disorder ([facility]) 5/15/2013   • Dyslipidemia 5/15/2013     Family History   Problem Relation Age of Onset   • Other Mother      unknown   • Other Father      unknown       Review of Systems   Musculoskeletal: Positive for myalgias.   All other systems reviewed and are negative.         Objective:     /72 mmHg  Pulse 73  Temp(Src) 36.2 °C (97.2 °F)  Resp 16  Ht 1.575 m (5' 2\")  Wt 58.06 kg (128 lb)  BMI 23.41 kg/m2  SpO2 96%     Physical Exam   Constitutional: She is oriented to person, place, and " time. She appears well-developed and well-nourished. No distress.   HENT:   Head: Normocephalic and atraumatic.   Right Ear: External ear normal.   Left Ear: External ear normal.   Nose: Nose normal.   Eyes: Right eye exhibits no discharge. Left eye exhibits no discharge.   Neck: Normal range of motion. Neck supple. No thyromegaly present.   Cardiovascular: Normal rate, regular rhythm and normal heart sounds.  Exam reveals no gallop and no friction rub.    No murmur heard.  Pulmonary/Chest: Effort normal and breath sounds normal. She has no wheezes. She has no rales.   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   Musculoskeletal: She exhibits no edema or tenderness.   No tenderness to palpation over the lower legs bilaterally. No edema present. There is no tenderness or complaints of knee pain. She does report some difficulty with walking although she uses a cane in the exam room. No pinpoint tenderness or redness present.   Neurological: She is alert and oriented to person, place, and time. She displays normal reflexes.   Skin: Skin is warm and dry. No rash noted. She is not diaphoretic.   Psychiatric: She has a normal mood and affect. Her behavior is normal. Judgment and thought content normal.   Nursing note and vitals reviewed.              Assessment/Plan:     1. Pain in both lower legs  Because of patient's poor history, I will send her for x-rays to rule out any fracture because she cannot tell me for sure she did not have any falls. I also will order a SRINIVAS study. I do not see anything from her recent lab work which would explain her symptoms. I told her if it worsens she will need to go the emergency room for full workup.  - DX-TIBIA AND FIBULA LEFT; Future  - DX-TIBIA AND FIBULA RIGHT; Future  - US-SRINIVAS SINGLE LEVEL BILAT; Future

## 2017-04-14 NOTE — MR AVS SNAPSHOT
"        Joelle Sotelo   2017 1:40 PM   Office Visit   MRN: [id number]    Department:  [facility]   Dept Phone:  [phone]    Description:  Female : 1928   Provider:  LEANNA Daniel           Reason for Visit     Leg Pain           Allergies as of 2017     Allergen Noted Reactions    Morphine 2015       Pt got extremely confused and agitated after morphine dose.       You were diagnosed with     Pain in both lower legs   [7256500]         Vital Signs     Blood Pressure Pulse Temperature Respirations Height Weight    118/72 mmHg 73 36.2 °C (97.2 °F) 16 1.575 m (5' 2\") 58.06 kg (128 lb)    Body Mass Index Oxygen Saturation Smoking Status             23.41 kg/m2 96% Never Smoker          Basic Information     Date Of Birth Sex Race Ethnicity Preferred Language    1928 Female White Non- English      Your appointments     2017  2:30 PM   XRAY 15 with [address] DX 1   [facility] - [address])    [address]  [place] [address]   [phone]              Problem List              ICD-10-CM Priority Class Noted - Resolved    Bipolar 2 disorder (CMS-HCC) F31.81   5/15/2013 - Present    Osteoporosis M81.0   2014 - Present    Diverticulitis of intestine with perforation K57.80 High  2015 - Present    MEDICAL HOME    2015 - Present    H/O colostomy Z98.890   3/31/2015 - Present    Risk for falls Z91.81   2015 - Present    Dementia F03.90   2016 - Present      Health Maintenance        Date Due Completion Dates    IMM ZOSTER VACCINE 1988 ---    BONE DENSITY 2016, 2010, 2009, 2008, 2006, 2004            Current Immunizations     13-VALENT PCV PREVNAR 10/25/2015  9:12 PM    Influenza TIV (IM) 10/2/2014    Influenza Vaccine Adult HD 10/25/2015 10:06 PM    Influenza Vaccine Quad Inj (Preserved) 2016    Pneumococcal polysaccharide vaccine (PPSV-23) " 11/2/2012      Below and/or attached are the medications your provider expects you to take. Review all of your home medications and newly ordered medications with your provider and/or pharmacist. Follow medication instructions as directed by your provider and/or pharmacist. Please keep your medication list with you and share with your provider. Update the information when medications are discontinued, doses are changed, or new medications (including over-the-counter products) are added; and carry medication information at all times in the event of emergency situations     Allergies:  MORPHINE - (reactions not documented)               Medications  Valid as of: April 14, 2017 -  2:20 PM    Generic Name Brand Name Tablet Size Instructions for use    Saccharomyces boulardii (Pack) Saccharomyces boulardii 250 MG Take 1 Each by mouth every day.        .                 Medicines prescribed today were sent to:     [facility] - [place], [place] - [address] [place] [address]    Phone: [phone] Fax: [phone]    Open 24 Hours?: No      Medication refill instructions:       If your prescription bottle indicates you have medication refills left, it is not necessary to call your provider’s office. Please contact your pharmacy and they will refill your medication.    If your prescription bottle indicates you do not have any refills left, you may request refills at any time through one of the following ways: The online [employer] system (except Urgent Care), by calling your provider’s office, or by asking your pharmacy to contact your provider’s office with a refill request. Medication refills are processed only during regular business hours and may not be available until the next business day. Your provider may request additional information or to have a follow-up visit with you prior to refilling your medication.   *Please Note: Medication refills are assigned a new Rx number when refilled  electronically. Your pharmacy may indicate that no refills were authorized even though a new prescription for the same medication is available at the pharmacy. Please request the medicine by name with the pharmacy before contacting your provider for a refill.        Your To Do List     Future Labs/Procedures Complete By Expires    DX-TIBIA AND FIBULA LEFT  As directed 10/15/2017    DX-TIBIA AND FIBULA RIGHT  As directed 10/15/2017    US-SRINIVAS SINGLE LEVEL BILAT  As directed 4/14/2018      Other Notes About Your Plan     ADSD Worker: Ju[place] [phone]           MyChart Status: Patient Declined

## 2017-05-16 ENCOUNTER — TELEPHONE (OUTPATIENT)
Dept: MEDICAL GROUP | Facility: MEDICAL CENTER | Age: 82
End: 2017-05-16

## 2017-05-16 NOTE — TELEPHONE ENCOUNTER
Gus, can you contact patient to come in for an appointment as she needs an evaluation for being able to live alone. I will also send this message on to our  to have her come in at the time of the visit.

## 2017-05-17 ENCOUNTER — TELEPHONE (OUTPATIENT)
Dept: MEDICAL GROUP | Facility: MEDICAL CENTER | Age: 82
End: 2017-05-17

## 2017-05-17 NOTE — TELEPHONE ENCOUNTER
i called patient at [phone], i have scheduled her an appt for next week thur 25th at 1:20pm... Message will be routed to  to see if she come in as requested per Narciso.

## 2017-05-17 NOTE — TELEPHONE ENCOUNTER
Future Appointments       Provider Department Center    5/25/2017 1:20 PM LEANNA Daniel [facility] [address]        ESTABLISHED PATIENT PRE-VISIT PLANNING     Note: Patient will not be contacted if there is no indication to call.     1.  Reviewed note from last office visit with PCP and/or other med group provider: Yes    2.  If any orders were placed at last visit, do we have Results/Consult Notes?        •  Labs - Labs were not ordered at last office visit.       •  Imaging - Imaging was not ordered at last office visit.       •  Referrals - Referral ordered, patient has NOT been seen.    3.  Immunizations were updated in [employer] using WebIZ?: Yes       •  Web Iz Recommendations: HEPATITIS A  HEPATITIS B TDAP VARICELLA (Chicken Pox)  ZOSTAVAX (Shingles)    4.  Patient is due for the following Health Maintenance Topics:   Health Maintenance Due   Topic Date Due   • IMM ZOSTER VACCINE  02/17/1988   • BONE DENSITY  09/26/2016           5.  Patient was not informed to arrive 15 min prior to their scheduled appointment and bring in their medication bottles.

## 2017-06-05 ENCOUNTER — OFFICE VISIT (OUTPATIENT)
Dept: MEDICAL GROUP | Facility: MEDICAL CENTER | Age: 82
End: 2017-06-05
Payer: MEDICARE

## 2017-06-05 VITALS
SYSTOLIC BLOOD PRESSURE: 118 MMHG | OXYGEN SATURATION: 93 % | HEIGHT: 62 IN | DIASTOLIC BLOOD PRESSURE: 74 MMHG | HEART RATE: 79 BPM | RESPIRATION RATE: 16 BRPM | TEMPERATURE: 97.2 F | WEIGHT: 125 LBS | BODY MASS INDEX: 23 KG/M2

## 2017-06-05 DIAGNOSIS — F31.81 BIPOLAR 2 DISORDER (HCC): ICD-10-CM

## 2017-06-05 DIAGNOSIS — F03.90 DEMENTIA WITHOUT BEHAVIORAL DISTURBANCE, UNSPECIFIED DEMENTIA TYPE: ICD-10-CM

## 2017-06-05 PROCEDURE — G8598 ASA/ANTIPLAT THER USED: HCPCS | Performed by: NURSE PRACTITIONER

## 2017-06-05 PROCEDURE — G8432 DEP SCR NOT DOC, RNG: HCPCS | Performed by: NURSE PRACTITIONER

## 2017-06-05 PROCEDURE — 4040F PNEUMOC VAC/ADMIN/RCVD: CPT | Performed by: NURSE PRACTITIONER

## 2017-06-05 PROCEDURE — G8420 CALC BMI NORM PARAMETERS: HCPCS | Performed by: NURSE PRACTITIONER

## 2017-06-05 PROCEDURE — 1101F PT FALLS ASSESS-DOCD LE1/YR: CPT | Performed by: NURSE PRACTITIONER

## 2017-06-05 PROCEDURE — 99213 OFFICE O/P EST LOW 20 MIN: CPT | Performed by: NURSE PRACTITIONER

## 2017-06-05 PROCEDURE — 1036F TOBACCO NON-USER: CPT | Performed by: NURSE PRACTITIONER

## 2017-06-05 ASSESSMENT — ENCOUNTER SYMPTOMS
MEMORY LOSS: 1
MYALGIAS: 1

## 2017-06-05 NOTE — MR AVS SNAPSHOT
"        Joelle Sotelo   2017 2:20 PM   Office Visit   MRN: [id number]    Department:  [address] [place] [address]   Dept Phone:  [phone]    Description:  Female : 1928   Provider:  LEANNA Daniel           Reason for Visit     Other evaluation to be able to live by herself       Allergies as of 2017     Allergen Noted Reactions    Morphine 2015       Pt got extremely confused and agitated after morphine dose.       You were diagnosed with     Dementia without behavioral disturbance, unspecified dementia type   [9087742]       Bipolar 2 disorder (CMS-HCC)   [370252]       H/O colostomy   [606004]         Vital Signs     Blood Pressure Pulse Temperature Respirations Height Weight    118/74 mmHg 79 36.2 °C (97.2 °F) 16 1.575 m (5' 2\") 56.7 kg (125 lb)    Body Mass Index Oxygen Saturation Smoking Status             22.86 kg/m2 93% Never Smoker          Basic Information     Date Of Birth Sex Race Ethnicity Preferred Language    1928 Female White Non- English      Your appointments     2017  1:00 PM   Access New To You with RAYMUNDO Villagomez   [facility] [address]    [address]  [facility] [address]  [place] [address] [phone]              Problem List              ICD-10-CM Priority Class Noted - Resolved    Bipolar 2 disorder (CMS-HCC) F31.81   5/15/2013 - Present    Osteoporosis M81.0   2014 - Present    Diverticulitis of intestine with perforation K57.80 High  2015 - Present    MEDICAL HOME    2015 - Present    H/O colostomy Z98.890   3/31/2015 - Present    Risk for falls Z91.81   2015 - Present    Dementia F03.90   2016 - Present      Health Maintenance        Date Due Completion Dates    IMM ZOSTER VACCINE 1988 ---    BONE DENSITY 2016, 2010, 2009, 2008, 2006, 2004            Current Immunizations     13-VALENT PCV PREVNAR 10/25/2015  9:12 PM    Influenza TIV (IM) " 10/2/2014    Influenza Vaccine Adult HD 10/25/2015 10:06 PM    Influenza Vaccine Quad Inj (Preserved) 11/12/2016    Pneumococcal polysaccharide vaccine (PPSV-23) 11/2/2012      Below and/or attached are the medications your provider expects you to take. Review all of your home medications and newly ordered medications with your provider and/or pharmacist. Follow medication instructions as directed by your provider and/or pharmacist. Please keep your medication list with you and share with your provider. Update the information when medications are discontinued, doses are changed, or new medications (including over-the-counter products) are added; and carry medication information at all times in the event of emergency situations     Allergies:  MORPHINE - (reactions not documented)               Medications  Valid as of: June 05, 2017 -  2:35 PM    Generic Name Brand Name Tablet Size Instructions for use    Saccharomyces boulardii (Pack) Saccharomyces boulardii 250 MG Take 1 Each by mouth every day.        .                 Medicines prescribed today were sent to:     [facility] - [place], [address] [address]    Phone: [phone] Fax: [phone]    Open 24 Hours?: No      Medication refill instructions:       If your prescription bottle indicates you have medication refills left, it is not necessary to call your provider’s office. Please contact your pharmacy and they will refill your medication.    If your prescription bottle indicates you do not have any refills left, you may request refills at any time through one of the following ways: The online [employer] system (except Urgent Care), by calling your provider’s office, or by asking your pharmacy to contact your provider’s office with a refill request. Medication refills are processed only during regular business hours and may not be available until the next business day. Your provider may request additional  information or to have a follow-up visit with you prior to refilling your medication.   *Please Note: Medication refills are assigned a new Rx number when refilled electronically. Your pharmacy may indicate that no refills were authorized even though a new prescription for the same medication is available at the pharmacy. Please request the medicine by name with the pharmacy before contacting your provider for a refill.        Referral     A referral request has been sent to our patient care coordination department. Please allow 3-5 business days for us to process this request and contact you either by phone or mail. If you do not hear from us by the 5th business day, please call us at [phone].        Other Notes About Your Plan     ADSD Worker: Juana [phone]           MyChart Status: Patient Declined

## 2017-06-05 NOTE — PROGRESS NOTES
Subjective:      Joelle Sotelo is a 89 y.o. female who presents with Other            Other  Associated symptoms include myalgias.   Joelle Sotelo is here today for evaluation on whether she can care for herself at home.       1. Dementia without behavioral disturbance, unspecified dementia type  A letter was received from  regarding concerned that patient may not be able to care for herself any further on her own. She is currently living in an apartment by herself. She previously had a friend Jorje and his wife who helped her but she states they are no longer in the picture because of their health status.    Patient feels that her walking ability has worsened. She currently uses a wheeled walker for mobility. She did have a syncopal episode for which she was seen at the emergency room in March. Her echocardiogram was normal and her CT and MRI of the brain showed no evidence of stroke. There was mild ectasia of the right vertebral artery but no flow limiting stenotic lesions.    Patient states she is having no problems with caring for her colostomy but she states it is getting harder for her to shop or make meals for herself and she is only eating one meal per day. She has trouble with ambulation and transportation. She takes no medications except for those for her bowel krystina and apparently does forget to take them. She had previously been referred to neurology for the memory clinic. She has no showed for her last 3 visits stating she did not remember that she was scheduled.    2. Bipolar 2 disorder (CMS-[facility])  Patient states she has not needed medicine for quite a while for her bipolar disorder and her mood has been stable in the office when I have seen her.    3. H/O colostomy  Patient does feel she can still care for her colostomy and change the bag regularly. She reports no problems with this.      Current Outpatient Prescriptions   Medication Sig Dispense Refill   •  "Saccharomyces boulardii 250 MG Pack Take 1 Each by mouth every day. 30 Each 5     No current facility-administered medications for this visit.     Social History   Substance Use Topics   • Smoking status: Never Smoker    • Smokeless tobacco: Never Used   • Alcohol Use: Yes      Comment: glass of wine every evening, last drink 3/2/2017     Family History   Problem Relation Age of Onset   • Other Mother      unknown   • Other Father      unknown     Past Medical History   Diagnosis Date   • Bipolar 2 disorder (CMS-[facility]) 5/15/2013   • Dyslipidemia 5/15/2013       Review of Systems   Musculoskeletal: Positive for myalgias.   Psychiatric/Behavioral: Positive for memory loss.   All other systems reviewed and are negative.         Objective:     /74 mmHg  Pulse 79  Temp(Src) 36.2 °C (97.2 °F)  Resp 16  Ht 1.575 m (5' 2\")  Wt 56.7 kg (125 lb)  BMI 22.86 kg/m2  SpO2 93%     Physical Exam   Constitutional: She is oriented to person, place, and time. She appears well-developed and well-nourished. She is cooperative.   HENT:   Head: Normocephalic and atraumatic.   Right Ear: External ear normal.   Left Ear: External ear normal.   Nose: Nose normal.   Eyes: Right eye exhibits no discharge. Left eye exhibits no discharge.   Neck: Normal range of motion. Neck supple. No thyromegaly present.   Cardiovascular: Normal rate, regular rhythm and normal heart sounds.  Exam reveals no gallop and no friction rub.    No murmur heard.  Pulmonary/Chest: Effort normal and breath sounds normal. She has no wheezes. She has no rales.   Musculoskeletal: She exhibits no edema or tenderness.   Neurological: She is oriented to person, place, and time. She displays normal reflexes.   Patient able to name the president and her birthday but cannot tell me the day of the week, date, or year.   Skin: Skin is warm and dry. No rash noted. She is not diaphoretic.   Psychiatric: She has a normal mood and affect. Her behavior is normal. Judgment " and thought content normal. Cognition and memory are impaired. She exhibits abnormal recent memory.   Nursing note and vitals reviewed.              Assessment/Plan:     1. Dementia without behavioral disturbance, unspecified dementia type  Patient is having increasing difficulty with caring for herself at home because of increasing memory issues and decreased physical ability. I will contact her  Department regarding this. I did hope to have our  here today at the visit but patient no showed for 3 other visits and subsequently we were unable to have the  available today. She will also need to transfer to a new PCP because of her persistent no-shows. I will get her back to the memory clinic. Her recent lab work does not show any cause for her symptoms. She had a normal echocardiogram and no evidence of CVA. I do not think she is a immediate danger to herself or others.  - REFERRAL TO NEUROLOGY    2. Bipolar 2 disorder (CMS-[facility])  Patient apparently not having severe mood swings or need for medications at this time.    3. H/O colostomy  Patient currently appears to be caring for her colostomy although she may need assistance in the future.      I spoke with patient ,GERDA Ku, after the visit and we agree that patient would do better in a more structured environment such as a group home and I will fax a copy of my notes to  today.

## 2017-06-19 ENCOUNTER — TELEPHONE (OUTPATIENT)
Dept: HEALTH INFORMATION MANAGEMENT | Facility: OTHER | Age: 82
End: 2017-06-19

## 2017-06-19 NOTE — TELEPHONE ENCOUNTER
Incoming VM received from pt's Aging and Disability , Juana, [phone].  Indicated she was just made aware that pt had not been wearing her colostomy bag and reported that home health was discharge. ADSD worker is concerned of medical consequence from pt not wearing back. Also has been working with pt to get placed in group home. If pt is unable to maintain her won colostomy bag group home will not be an option. Reported she has asked Quinton RN to go back out to assess pt.      Plan:  · Message routed to PCP as update and to follow up as needed.

## 2017-06-21 ENCOUNTER — TELEPHONE (OUTPATIENT)
Dept: MEDICAL GROUP | Facility: MEDICAL CENTER | Age: 82
End: 2017-06-21

## 2017-06-27 ENCOUNTER — TELEPHONE (OUTPATIENT)
Dept: MEDICAL GROUP | Facility: MEDICAL CENTER | Age: 82
End: 2017-06-27

## 2017-06-27 NOTE — TELEPHONE ENCOUNTER
Future Appointments       Provider Department Center    7/6/2017 1:00 PM RAYMUNDO Villagomez [facility] [address]        ESTABLISHED PATIENT PRE-VISIT PLANNING     Note: Patient will not be contacted if there is no indication to call.     1.  Reviewed note from last office visit with PCP and/or other med group provider: Yes    2.  If any orders were placed at last visit, do we have Results/Consult Notes?        •  Labs - Labs were not ordered at last office visit.       •  Imaging - Imaging was not ordered at last office visit.       •  Referrals - Referral ordered, patient has NOT been seen.    3.  Immunizations were updated in [employer] using WebIZ?: Yes       •  Web Iz Recommendations: HEPATITIS A  TDAP ZOSTAVAX (Shingles)    4.  Patient is due for the following Health Maintenance Topics:   Health Maintenance Due   Topic Date Due   • IMM ZOSTER VACCINE  02/17/1988   • BONE DENSITY  09/26/2016           5.  Patient was not informed to arrive 15 min prior to their scheduled appointment and bring in their medication bottles.

## 2017-06-29 ENCOUNTER — TELEPHONE (OUTPATIENT)
Dept: MEDICAL GROUP | Facility: MEDICAL CENTER | Age: 82
End: 2017-06-29

## 2017-06-29 DIAGNOSIS — K57.20 DIVERTICULITIS OF COLON WITH PERFORATION: ICD-10-CM

## 2017-06-29 NOTE — TELEPHONE ENCOUNTER
1. Caller Name: Jesse [facility]                                         Call Back Number: [phone]  Fax: [phone]        Patient approves a detailed voicemail message: N\A    Jesse, called for the patient she said that she needed the prescription for the medication Saccharomyces boulardii, faxed back to the with the doctors signature? Please advise

## 2017-07-06 ENCOUNTER — OFFICE VISIT (OUTPATIENT)
Dept: MEDICAL GROUP | Facility: MEDICAL CENTER | Age: 82
End: 2017-07-06
Payer: MEDICARE

## 2017-07-06 ENCOUNTER — HOME HEALTH ADMISSION (OUTPATIENT)
Dept: HOME HEALTH SERVICES | Facility: HOME HEALTHCARE | Age: 82
End: 2017-07-06
Payer: MEDICARE

## 2017-07-06 VITALS
RESPIRATION RATE: 16 BRPM | WEIGHT: 124 LBS | SYSTOLIC BLOOD PRESSURE: 102 MMHG | BODY MASS INDEX: 22.82 KG/M2 | DIASTOLIC BLOOD PRESSURE: 60 MMHG | HEART RATE: 105 BPM | TEMPERATURE: 98.6 F | HEIGHT: 62 IN | OXYGEN SATURATION: 93 %

## 2017-07-06 DIAGNOSIS — Z87.19 HISTORY OF DIVERTICULITIS: ICD-10-CM

## 2017-07-06 DIAGNOSIS — F31.81 BIPOLAR 2 DISORDER (HCC): ICD-10-CM

## 2017-07-06 DIAGNOSIS — Z43.3 COLOSTOMY CARE (HCC): ICD-10-CM

## 2017-07-06 DIAGNOSIS — F02.80 ALZHEIMER'S DEMENTIA WITHOUT BEHAVIORAL DISTURBANCE, UNSPECIFIED TIMING OF DEMENTIA ONSET: ICD-10-CM

## 2017-07-06 DIAGNOSIS — G30.9 ALZHEIMER'S DEMENTIA WITHOUT BEHAVIORAL DISTURBANCE, UNSPECIFIED TIMING OF DEMENTIA ONSET: ICD-10-CM

## 2017-07-06 DIAGNOSIS — M81.8 OTHER OSTEOPOROSIS WITHOUT CURRENT PATHOLOGICAL FRACTURE: ICD-10-CM

## 2017-07-06 PROCEDURE — 99215 OFFICE O/P EST HI 40 MIN: CPT | Performed by: NURSE PRACTITIONER

## 2017-07-06 ASSESSMENT — PAIN SCALES - GENERAL: PAINLEVEL: NO PAIN

## 2017-07-06 NOTE — ASSESSMENT & PLAN NOTE
Pt has been using colostomy following intestinal rupture related to diverticulitis.  Presently not wearing her colostomy bag. She recently relocated from her old living facility to a new group home living facility at [facility]. According to her power of  she has forgotten how to care for her ostomy bag. It appears that she was having home health at her previous living location for this problem.  Review of notes indicate that she has had issues with not being able to care for her ostomy. According to her power of  (Jorje) she used to be able to care for her colostomy but with her dementia she seems to have forgotten.

## 2017-07-06 NOTE — MR AVS SNAPSHOT
"        Joelle Sandi Sotelo   2017 1:00 PM   Office Visit   MRN: [id number]    Department:  [address] [place] [address]   Dept Phone:  [phone]    Description:  Female : 1928   Provider:  RAYMUNDO Villagomez           Allergies as of 2017     Allergen Noted Reactions    Morphine 2015       Pt got extremely confused and agitated after morphine dose.       You were diagnosed with     Colostomy care (CMS-HCC)   [777396]       Diverticulitis of colon with perforation   [642408]       Bipolar 2 disorder (CMS-HCC)   [884250]       Other osteoporosis without current pathological fracture   [3851747]       H/O colostomy   [508237]         Vital Signs     Blood Pressure Pulse Temperature Respirations Height Weight    102/60 mmHg 105 37 °C (98.6 °F) 16 1.575 m (5' 2.01\") 56.246 kg (124 lb)    Body Mass Index Oxygen Saturation Smoking Status             22.67 kg/m2 93% Never Smoker          Basic Information     Date Of Birth Sex Race Ethnicity Preferred Language    1928 Female White Non- English      Problem List              ICD-10-CM Priority Class Noted - Resolved    Bipolar 2 disorder (CMS-HCC) F31.81   5/15/2013 - Present    Osteoporosis M81.0   2014 - Present    MEDICAL HOME    2015 - Present    H/O colostomy Z98.890   3/31/2015 - Present    Risk for falls Z91.81   2015 - Present    Dementia F03.90   2016 - Present      Health Maintenance        Date Due Completion Dates    IMM ZOSTER VACCINE 1988 ---    BONE DENSITY 2016, 2010, 2009, 2008, 2006, 2004    IMM INFLUENZA (1) 2016, 10/25/2015, 10/2/2014            Current Immunizations     13-VALENT PCV PREVNAR 10/25/2015  9:12 PM    Influenza TIV (IM) 10/2/2014    Influenza Vaccine Adult HD 10/25/2015 10:06 PM    Influenza Vaccine Quad Inj (Preserved) 2016    Pneumococcal polysaccharide vaccine (PPSV-23) 2012      Below and/or attached are the " medications your provider expects you to take. Review all of your home medications and newly ordered medications with your provider and/or pharmacist. Follow medication instructions as directed by your provider and/or pharmacist. Please keep your medication list with you and share with your provider. Update the information when medications are discontinued, doses are changed, or new medications (including over-the-counter products) are added; and carry medication information at all times in the event of emergency situations     Allergies:  MORPHINE - (reactions not documented)               Medications  Valid as of: July 06, 2017 -  2:19 PM    Generic Name Brand Name Tablet Size Instructions for use    Misc. Devices (Misc) Misc. Devices  Colostomy bag supplies        Saccharomyces boulardii (Pack) Saccharomyces boulardii 250 MG Take 1 Each by mouth every day.        .                 Medicines prescribed today were sent to:     [facility] - [place], [place] - [address] [address]    Phone: [phone] Fax: [phone]    Open 24 Hours?: No    [facility] - [facility] [address] [place] [address]    [address] [facility] [address]    Phone: [phone] Fax: [phone]    Open 24 Hours?: No      Medication refill instructions:       If your prescription bottle indicates you have medication refills left, it is not necessary to call your provider’s office. Please contact your pharmacy and they will refill your medication.    If your prescription bottle indicates you do not have any refills left, you may request refills at any time through one of the following ways: The online [employer] system (except Urgent Care), by calling your provider’s office, or by asking your pharmacy to contact your provider’s office with a refill request. Medication refills are processed only during regular business hours and may not be available until the next business day. Your provider  may request additional information or to have a follow-up visit with you prior to refilling your medication.   *Please Note: Medication refills are assigned a new Rx number when refilled electronically. Your pharmacy may indicate that no refills were authorized even though a new prescription for the same medication is available at the pharmacy. Please request the medicine by name with the pharmacy before contacting your provider for a refill.        Your To Do List     Future Labs/Procedures Complete By Expires    DS-BONE DENSITY STUDY (DEXA)  As directed 7/6/2018      Referral     A referral request has been sent to our patient care coordination department. Please allow 3-5 business days for us to process this request and contact you either by phone or mail. If you do not hear from us by the 5th business day, please call us at [phone].        Other Notes About Your Plan     ADSD Worker: [place] [phone]           MyChart Status: Patient Declined

## 2017-07-06 NOTE — ASSESSMENT & PLAN NOTE
No medication at this time and has not taken since at least 4/2016. Was taking risperidone 4mg.  Pt's responsible person (power of ) states that she has been stable and no reports of behavior disturbances from assisted living facility.

## 2017-07-06 NOTE — PROGRESS NOTES
Face to Face Supporting Documentation - Home Health    The encounter with this patient was in whole or in part the primary reason for home health admission.    Date of encounter:   Patient:                    MRN:                       YOB: 2017  Joelle Sotelo  [id number]  2/17/1928     Home health to see patient for:  Skilled Nursing care for assessment, interventions & education and Comment: colostomy bag    Skilled need for:  Comment: colostomy bag    Skilled nursing interventions to include:  Comment: colostomy bag    Homebound status evidenced by:  Needs the assistance of another person in order to leave the home. patient has dementia. Leaving home requires a considerable and taxing effort. There is a normal inability to leave the home.    Community Physician to provide follow up care: Natalee Edwards MD    Optional Interventions? No      I certify the face to face encounter for this home health care referral meets the CMS requirements and the encounter/clinical assessment with the patient was, in whole, or in part, for the medical condition(s) listed above, which is the primary reason for home health care. Based on my clinical findings: the service(s) are medically necessary, support the need for home health care, and the homebound criteria are met.  I certify that this patient has had a face to face encounter by the nurse practitioner working collaboratively with me.  KESHIA Villagomez. - NPI: [id number]

## 2017-07-06 NOTE — PROGRESS NOTES
Subjective:     Chief Complaint   Patient presents with   • Other     ostomy     Joelle Sotelo is a 89 y.o. female here today with her power of  (Jorje) for evaluation and management of:    Bipolar 2 disorder  No medication at this time and has not taken since at least 4/2016. Was taking risperidone 4mg.  Pt's responsible person (power of ) states that she has been stable and no reports of behavior disturbances from assisted living facility.     Osteoporosis  Due for DEXA. Was on Fosamax-no meds currently  No recent fracture.     H/O colostomy  Pt has been using colostomy following intestinal rupture related to diverticulitis.  Presently not wearing her colostomy bag. She recently relocated from her old living facility to a new group home living facility at [facility]. According to her power of  she has forgotten how to care for her ostomy bag. It appears that she was having home health at her previous living location for this problem.  Review of notes indicate that she has had issues with not being able to care for her ostomy. According to her power of  (Jorje) she used to be able to care for her colostomy but with her dementia she seems to have forgotten.     Dementia  Last seen by her neurologist one year ago  She has not been back for follow-up due to missed appt  Her power of  (Jorje) will make follow-up as he is wondering if she should be taking medication . At last office visit there was some discussion about starting medication           Current medicines (including changes today)  Current Outpatient Prescriptions   Medication Sig Dispense Refill   • Saccharomyces boulardii 250 MG Pack Take 1 Each by mouth every day. 30 Each 11   • Misc. Devices Misc Colostomy bag supplies 1 Container 0     No current facility-administered medications for this visit.     She  has a past medical history of Bipolar 2 disorder (CMS-[facility]) (5/15/2013) and Dyslipidemia  "(5/15/2013).    HM:Due for DEXA  ROS   Constitutional: Negative for fever, chills/sweats   Respiratory: Negative for shortness of breath, HERMAN  Cardiovascular: Negative for chest pain or pressure  GI/: Negative for diarrhea/constipation / urinary difficulty  All other systems reviewed and are negative except as per HPI.          Objective:     Blood pressure 102/60, pulse 105, temperature 37 °C (98.6 °F), resp. rate 16, height 1.575 m (5' 2.01\"), weight 56.246 kg (124 lb), SpO2 93 %. Body mass index is 22.67 kg/(m^2).   Physical Exam:  Alert, oriented to self. Pleasant and not in distress. Somewhat unkempt   Eye contact is good, affect calm  HEENT: conjunctiva non-injected, sclera non-icteric.  Pinna normal.    Neck No adenopathy or masses in the neck or supraclavicular regions.  Lungs: clear to auscultation bilaterally with good excursion.  CV: regular rate and rhythm.  Abdomen: soft, nontender, stoma beefy red. Surrounding skin intact.  Ext: no edema, color normal, vascularity normal, temperature normal  Skin: Warm, dry, good turgor, no rashes in visible areas.      Assessment and Plan:   The following treatment plan was discussed   1. Colostomy care ([facility])  [facility] Devices Misc    DISCONTINUED: Misc. Devices Misc    We called [facility] to try to get patient in today to have colostomy bag placed. Unable to get patient on schedule. Called [facility] health and they have indicated that although she has been discharged from home health they will try to get out to see her tomorrow hopefully. Apparently she does have some ostomy supplies at home and will try to get at least some kind of bag in place to catch stool.   Advised to keep clean and dry the best she can.    2. History of diverticulitis  Saccharomyces boulardii 250 MG Pack    Refill done.    3. Bipolar 2 disorder (CMS-H[facility])   stable no medication at this time.    4. Other osteoporosis without current pathological fracture "  DS-BONE DENSITY STUDY (DEXA)   5. H/O colostomy   referral to home health done for colostomy care and teaching.    6. Alzheimer's dementia without behavioral disturbance, unspecified timing of dementia onset   patient's power of -Jorje-will make appointment for follow-up with her neurologist.      Patient was seen for greater than 60 minutes face to face of which > 50% of appointment time was spent on counseling and coordination of care regarding the above.        Followup: Return in about 3 months (around 10/6/2017). or sooner for new symptoms or should new problems arise      This dictation was created using voice recognition software. The accuracy of the dictation is limited to the abilities of the software. I expect there may be some errors of grammar and possibly content. The MA notes were reviewed and certain aspects of this information were incorporated into this note.

## 2017-07-06 NOTE — ASSESSMENT & PLAN NOTE
Last seen by her neurologist one year ago  She has not been back for follow-up due to missed appt  Her power of  (Jorje) will make follow-up as he is wondering if she should be taking medication . At last office visit there was some discussion about starting medication

## 2017-07-10 ENCOUNTER — PATIENT OUTREACH (OUTPATIENT)
Dept: HEALTH INFORMATION MANAGEMENT | Facility: OTHER | Age: 82
End: 2017-07-10

## 2017-07-10 NOTE — PROGRESS NOTES
Incoming call from ADSD worker Juana reporting concern that home health care has not established with pt and concerns regarding pt's ability to independently care for colostomy bag. Per chart review referral was placed by PCP Carole TEAGUE on 07/06/2017 and appears referral was sent to [facility]. ADSD worker reporting that if pt is unable to care for bag independently SNF options would have to be looked into as group home staff can not physically assist with colostomy bag (they can only provide cueing). Goal was now that pt is in a more structured environment she would be able to be retrained on her colostomy bag and possibly be able to remain in group home placement.     Outreach call to [facility] to follow up on referral place/status.  with [facility] reported referral was denied due to pt not being appropriate candidate for home health care. Representative explained because there was not a teachable caregiver and due to pt's cognitive decline agency does not feel pt would be teachable. Representative reported VM was left for PCP office to notify. LSW unable to find any documentation regarding referral being denied.     Outreach call to referral department to request referral be sent to [facility] home health company. Referral specialist did not answer and VM was left with request.     Last LSW called ADSD worker back to provide update. VM left for ADSD worker.     Plan:  · LSW will notify PCP of the above.   · LSW will not actively follow at this time as pt does have  through ADSD addressing social needs however will assist as need to provide supportive services to ADSD worker.

## 2017-07-17 ENCOUNTER — PATIENT OUTREACH (OUTPATIENT)
Dept: HEALTH INFORMATION MANAGEMENT | Facility: OTHER | Age: 82
End: 2017-07-17

## 2017-07-17 ENCOUNTER — TELEPHONE (OUTPATIENT)
Dept: HEALTH INFORMATION MANAGEMENT | Facility: OTHER | Age: 82
End: 2017-07-17

## 2017-07-17 DIAGNOSIS — Z91.89 AT RISK FOR SELF CARE DEFICIT: ICD-10-CM

## 2017-07-17 DIAGNOSIS — Z91.81 RISK FOR FALLS: ICD-10-CM

## 2017-07-17 DIAGNOSIS — F03.90 DEMENTIA WITHOUT BEHAVIORAL DISTURBANCE, UNSPECIFIED DEMENTIA TYPE: ICD-10-CM

## 2017-07-17 DIAGNOSIS — F31.81 BIPOLAR 2 DISORDER (HCC): ICD-10-CM

## 2017-07-17 NOTE — TELEPHONE ENCOUNTER
[facility] visited pt in group home last week and reported back that pt is not able to care for colostomy bag independently. Group home is unable to care for colostomy bag and pt will need a higher level of care. LSW and ADSD worker, looking into SNF placement for long-term residential care. [facility] admissions informed LSW that facility would be able to accept pt for long-term care towards the end of this week pending PASRR completion and order for admission from PCP. PASRR has been submitted and pending review.     Plan:  · Message routed to PCP to request order for pt to be admitted to [facility] for long-term care, if appropriate.   · If PCP is agreeable please fax order to [phone] Attn: Donna

## 2017-07-17 NOTE — PROGRESS NOTES
Follow up with ADSD worker Juana who reported [facility] did come out last week to evaluate pt and colostomy bag in group home. ADSD worker indicated RN with [facility] reported pt was unable to comprehend or understand steps needed to manage colostomy bag and does not feel pt is going to be able to maintain colostomy bag. Give pt's inability to care for or be educated on independently caring for colostomy bag pt will need a higher level of care as group home staff can not care for bag.     [facility] worker contacted local skilled nursing facilities in the area to check if there were any long-term care/Medicaid beds available and reported she was told by [facility] they currently had one bed. Referral to [facility] placed to determine if facility would be willing to accept pt as long-term care resident. Reported [facility] is agreeable to providing services to pt while she is at the group home and while alternative placement is searched for.     PASRR will need to also be completed prior to facility being able to accept pt if she is accepted for long-term care placement. LSW will work and collaborate with [facility] worker to assist as needed in finding alternative placement to meet pt's care needs.     Plan:  · Care plan initiated.

## 2017-07-18 NOTE — TELEPHONE ENCOUNTER
[facility] See documentation noted from Alma Delia Cowan.

## 2017-07-18 NOTE — TELEPHONE ENCOUNTER
Order would need to be wrote indicating pt to admitted to Skilled Nursing Facility for Long-term care and be faxed over to [facility]. Fax info in previous note.

## 2017-07-19 ENCOUNTER — PATIENT OUTREACH (OUTPATIENT)
Dept: HEALTH INFORMATION MANAGEMENT | Facility: OTHER | Age: 82
End: 2017-07-19

## 2017-07-19 NOTE — PROGRESS NOTES
Report from PCP order for SNF admission was completed and faxed over the [facility]. LSW also received PASRR/LOC back and faxed to [facility]. Outreach call to [facility] and ADSD worker to follow up and collaborate on care as needed. Neither answered. VM left for both and encouraged to call each other to make admission arrangements for pt.     Plan:  · LSW will continue to follow as needed and assist in obtaining higher level of care placement at SNF.

## 2017-07-20 ENCOUNTER — PATIENT OUTREACH (OUTPATIENT)
Dept: HEALTH INFORMATION MANAGEMENT | Facility: OTHER | Age: 82
End: 2017-07-20

## 2017-07-20 NOTE — PROGRESS NOTES
[facility] facility needs LOC to be completed prior to being able to admitted pt to facility. LOC form was completed and faxed into [facility]. Currently pending. Update provided to ADSD worker and [facility] admissions.     Plan:  · LSW will continue to assist and collaborate with ADSD worker and [facility] to work towards obtaining higher level of care placement for pt.

## 2017-07-22 ENCOUNTER — HOSPITAL ENCOUNTER (OUTPATIENT)
Dept: LAB | Facility: MEDICAL CENTER | Age: 82
End: 2017-07-22
Attending: INTERNAL MEDICINE
Payer: COMMERCIAL

## 2017-07-25 ENCOUNTER — HOSPITAL ENCOUNTER (OUTPATIENT)
Dept: LAB | Facility: MEDICAL CENTER | Age: 82
End: 2017-07-25
Attending: INTERNAL MEDICINE
Payer: COMMERCIAL

## 2017-07-25 LAB
ANION GAP SERPL CALC-SCNC: 7 MMOL/L (ref 0–11.9)
BASOPHILS # BLD AUTO: 0.9 % (ref 0–1.8)
BASOPHILS # BLD: 0.09 K/UL (ref 0–0.12)
BUN SERPL-MCNC: 15 MG/DL (ref 8–22)
CALCIUM SERPL-MCNC: 9.1 MG/DL (ref 8.5–10.5)
CHLORIDE SERPL-SCNC: 104 MMOL/L (ref 96–112)
CO2 SERPL-SCNC: 29 MMOL/L (ref 20–33)
CREAT SERPL-MCNC: 0.57 MG/DL (ref 0.5–1.4)
EOSINOPHIL # BLD AUTO: 0.39 K/UL (ref 0–0.51)
EOSINOPHIL NFR BLD: 3.9 % (ref 0–6.9)
ERYTHROCYTE [DISTWIDTH] IN BLOOD BY AUTOMATED COUNT: 42.5 FL (ref 35.9–50)
GFR SERPL CREATININE-BSD FRML MDRD: >60 ML/MIN/1.73 M 2
GLUCOSE SERPL-MCNC: 80 MG/DL (ref 65–99)
HCT VFR BLD AUTO: 38.7 % (ref 37–47)
HGB BLD-MCNC: 12.8 G/DL (ref 12–16)
IMM GRANULOCYTES # BLD AUTO: 0.04 K/UL (ref 0–0.11)
IMM GRANULOCYTES NFR BLD AUTO: 0.4 % (ref 0–0.9)
LYMPHOCYTES # BLD AUTO: 2.31 K/UL (ref 1–4.8)
LYMPHOCYTES NFR BLD: 23.2 % (ref 22–41)
MCH RBC QN AUTO: 28.2 PG (ref 27–33)
MCHC RBC AUTO-ENTMCNC: 33.1 G/DL (ref 33.6–35)
MCV RBC AUTO: 85.2 FL (ref 81.4–97.8)
MONOCYTES # BLD AUTO: 0.89 K/UL (ref 0–0.85)
MONOCYTES NFR BLD AUTO: 9 % (ref 0–13.4)
NEUTROPHILS # BLD AUTO: 6.22 K/UL (ref 2–7.15)
NEUTROPHILS NFR BLD: 62.6 % (ref 44–72)
NRBC # BLD AUTO: 0 K/UL
NRBC BLD AUTO-RTO: 0 /100 WBC
PLATELET # BLD AUTO: 308 K/UL (ref 164–446)
PMV BLD AUTO: 10.4 FL (ref 9–12.9)
POTASSIUM SERPL-SCNC: 4 MMOL/L (ref 3.6–5.5)
RBC # BLD AUTO: 4.54 M/UL (ref 4.2–5.4)
SODIUM SERPL-SCNC: 140 MMOL/L (ref 135–145)
WBC # BLD AUTO: 9.9 K/UL (ref 4.8–10.8)

## 2017-08-01 ENCOUNTER — HOSPITAL ENCOUNTER (OUTPATIENT)
Dept: LAB | Facility: MEDICAL CENTER | Age: 82
End: 2017-08-01
Attending: INTERNAL MEDICINE
Payer: MEDICARE

## 2017-08-07 ENCOUNTER — PATIENT OUTREACH (OUTPATIENT)
Dept: HEALTH INFORMATION MANAGEMENT | Facility: OTHER | Age: 82
End: 2017-08-07

## 2017-08-07 PROBLEM — Z91.89 AT RISK FOR SELF CARE DEFICIT: Status: RESOLVED | Noted: 2017-07-17 | Resolved: 2017-08-07

## 2017-08-07 NOTE — PROGRESS NOTES
All necessary documents such as PASRR and LOC were completed and pt was admitted to [facility] for long-term residential care. Pt's SW goal and interventions met. At this time LSW will graduate from SW care coordination. Any ongoing social needs to be addressed by SW with [facility].     PLAN:  · Pt graduated from SW care coordination

## 2017-08-14 LAB
ANION GAP SERPL CALC-SCNC: 11 MMOL/L (ref 0–11.9)
BASOPHILS # BLD AUTO: 0.5 % (ref 0–1.8)
BASOPHILS # BLD: 0.05 K/UL (ref 0–0.12)
BNP SERPL-MCNC: 41 PG/ML (ref 0–100)
BUN SERPL-MCNC: 18 MG/DL (ref 8–22)
CALCIUM SERPL-MCNC: 8.8 MG/DL (ref 8.5–10.5)
CHLORIDE SERPL-SCNC: 97 MMOL/L (ref 96–112)
CO2 SERPL-SCNC: 26 MMOL/L (ref 20–33)
CREAT SERPL-MCNC: 0.5 MG/DL (ref 0.5–1.4)
CRP SERPL HS-MCNC: 60.8 MG/L (ref 0–7.5)
EOSINOPHIL # BLD AUTO: 0.1 K/UL (ref 0–0.51)
EOSINOPHIL NFR BLD: 1.1 % (ref 0–6.9)
ERYTHROCYTE [DISTWIDTH] IN BLOOD BY AUTOMATED COUNT: 43.3 FL (ref 35.9–50)
ERYTHROCYTE [SEDIMENTATION RATE] IN BLOOD BY WESTERGREN METHOD: 47 MM/HOUR (ref 0–30)
GFR SERPL CREATININE-BSD FRML MDRD: >60 ML/MIN/1.73 M 2
GLUCOSE SERPL-MCNC: 92 MG/DL (ref 65–99)
HCT VFR BLD AUTO: 35.2 % (ref 37–47)
HGB BLD-MCNC: 11.7 G/DL (ref 12–16)
IMM GRANULOCYTES # BLD AUTO: 0.03 K/UL (ref 0–0.11)
IMM GRANULOCYTES NFR BLD AUTO: 0.3 % (ref 0–0.9)
LYMPHOCYTES # BLD AUTO: 2.05 K/UL (ref 1–4.8)
LYMPHOCYTES NFR BLD: 21.9 % (ref 22–41)
MCH RBC QN AUTO: 28.5 PG (ref 27–33)
MCHC RBC AUTO-ENTMCNC: 33.2 G/DL (ref 33.6–35)
MCV RBC AUTO: 85.6 FL (ref 81.4–97.8)
MONOCYTES # BLD AUTO: 1.39 K/UL (ref 0–0.85)
MONOCYTES NFR BLD AUTO: 14.9 % (ref 0–13.4)
NEUTROPHILS # BLD AUTO: 5.73 K/UL (ref 2–7.15)
NEUTROPHILS NFR BLD: 61.3 % (ref 44–72)
NRBC # BLD AUTO: 0 K/UL
NRBC BLD AUTO-RTO: 0 /100 WBC
PLATELET # BLD AUTO: 213 K/UL (ref 164–446)
PMV BLD AUTO: 11 FL (ref 9–12.9)
POTASSIUM SERPL-SCNC: 3.9 MMOL/L (ref 3.6–5.5)
RBC # BLD AUTO: 4.11 M/UL (ref 4.2–5.4)
SODIUM SERPL-SCNC: 134 MMOL/L (ref 135–145)
TSH SERPL DL<=0.005 MIU/L-ACNC: 2.16 UIU/ML (ref 0.3–3.7)
WBC # BLD AUTO: 9.4 K/UL (ref 4.8–10.8)

## 2017-08-14 PROCEDURE — 86141 C-REACTIVE PROTEIN HS: CPT

## 2017-08-14 PROCEDURE — 84443 ASSAY THYROID STIM HORMONE: CPT

## 2017-08-14 PROCEDURE — 80048 BASIC METABOLIC PNL TOTAL CA: CPT

## 2017-08-14 PROCEDURE — 83880 ASSAY OF NATRIURETIC PEPTIDE: CPT

## 2017-08-14 PROCEDURE — 85652 RBC SED RATE AUTOMATED: CPT

## 2017-08-14 PROCEDURE — 85025 COMPLETE CBC W/AUTO DIFF WBC: CPT

## 2017-08-14 PROCEDURE — 36415 COLL VENOUS BLD VENIPUNCTURE: CPT

## 2017-09-01 ENCOUNTER — HOSPITAL ENCOUNTER (OUTPATIENT)
Dept: LAB | Facility: MEDICAL CENTER | Age: 82
End: 2017-09-01
Attending: INTERNAL MEDICINE
Payer: MEDICARE

## 2017-10-01 ENCOUNTER — HOSPITAL ENCOUNTER (OUTPATIENT)
Dept: LAB | Facility: MEDICAL CENTER | Age: 82
End: 2017-10-01
Attending: INTERNAL MEDICINE
Payer: MEDICARE

## 2017-11-01 ENCOUNTER — HOSPITAL ENCOUNTER (OUTPATIENT)
Dept: LAB | Facility: MEDICAL CENTER | Age: 82
End: 2017-11-01
Attending: INTERNAL MEDICINE
Payer: MEDICARE

## 2017-12-01 ENCOUNTER — HOSPITAL ENCOUNTER (OUTPATIENT)
Dept: LAB | Facility: MEDICAL CENTER | Age: 82
End: 2017-12-01
Attending: INTERNAL MEDICINE
Payer: MEDICARE

## 2018-01-01 ENCOUNTER — HOSPITAL ENCOUNTER (OUTPATIENT)
Dept: LAB | Facility: MEDICAL CENTER | Age: 83
End: 2018-01-01
Attending: INTERNAL MEDICINE
Payer: MEDICARE

## 2018-02-01 ENCOUNTER — HOSPITAL ENCOUNTER (OUTPATIENT)
Dept: LAB | Facility: MEDICAL CENTER | Age: 83
End: 2018-02-01
Attending: INTERNAL MEDICINE
Payer: MEDICARE

## 2018-02-05 LAB
ANION GAP SERPL CALC-SCNC: 4 MMOL/L (ref 0–11.9)
BASOPHILS # BLD AUTO: 1 % (ref 0–1.8)
BASOPHILS # BLD: 0.07 K/UL (ref 0–0.12)
BNP SERPL-MCNC: 46 PG/ML (ref 0–100)
BUN SERPL-MCNC: 23 MG/DL (ref 8–22)
CALCIUM SERPL-MCNC: 8.6 MG/DL (ref 8.5–10.5)
CHLORIDE SERPL-SCNC: 104 MMOL/L (ref 96–112)
CO2 SERPL-SCNC: 28 MMOL/L (ref 20–33)
CREAT SERPL-MCNC: 0.55 MG/DL (ref 0.5–1.4)
EOSINOPHIL # BLD AUTO: 0.36 K/UL (ref 0–0.51)
EOSINOPHIL NFR BLD: 5.2 % (ref 0–6.9)
ERYTHROCYTE [DISTWIDTH] IN BLOOD BY AUTOMATED COUNT: 43 FL (ref 35.9–50)
GLUCOSE SERPL-MCNC: 96 MG/DL (ref 65–99)
HCT VFR BLD AUTO: 37.6 % (ref 37–47)
HGB BLD-MCNC: 12.3 G/DL (ref 12–16)
IMM GRANULOCYTES # BLD AUTO: 0.01 K/UL (ref 0–0.11)
IMM GRANULOCYTES NFR BLD AUTO: 0.1 % (ref 0–0.9)
LYMPHOCYTES # BLD AUTO: 1.77 K/UL (ref 1–4.8)
LYMPHOCYTES NFR BLD: 25.8 % (ref 22–41)
MCH RBC QN AUTO: 27.9 PG (ref 27–33)
MCHC RBC AUTO-ENTMCNC: 32.7 G/DL (ref 33.6–35)
MCV RBC AUTO: 85.3 FL (ref 81.4–97.8)
MONOCYTES # BLD AUTO: 0.91 K/UL (ref 0–0.85)
MONOCYTES NFR BLD AUTO: 13.3 % (ref 0–13.4)
NEUTROPHILS # BLD AUTO: 3.74 K/UL (ref 2–7.15)
NEUTROPHILS NFR BLD: 54.6 % (ref 44–72)
NRBC # BLD AUTO: 0 K/UL
NRBC BLD-RTO: 0 /100 WBC
PLATELET # BLD AUTO: 215 K/UL (ref 164–446)
PMV BLD AUTO: 10.5 FL (ref 9–12.9)
POTASSIUM SERPL-SCNC: 4.2 MMOL/L (ref 3.6–5.5)
RBC # BLD AUTO: 4.41 M/UL (ref 4.2–5.4)
SODIUM SERPL-SCNC: 136 MMOL/L (ref 135–145)
WBC # BLD AUTO: 6.9 K/UL (ref 4.8–10.8)

## 2018-02-05 PROCEDURE — 36415 COLL VENOUS BLD VENIPUNCTURE: CPT

## 2018-02-05 PROCEDURE — 83880 ASSAY OF NATRIURETIC PEPTIDE: CPT

## 2018-02-05 PROCEDURE — 80048 BASIC METABOLIC PNL TOTAL CA: CPT

## 2018-02-05 PROCEDURE — 85025 COMPLETE CBC W/AUTO DIFF WBC: CPT

## 2018-02-16 ENCOUNTER — HOSPITAL ENCOUNTER (EMERGENCY)
Facility: MEDICAL CENTER | Age: 83
End: 2018-02-16
Attending: EMERGENCY MEDICINE
Payer: MEDICARE

## 2018-02-16 ENCOUNTER — APPOINTMENT (OUTPATIENT)
Dept: RADIOLOGY | Facility: IMAGING CENTER | Age: 83
End: 2018-02-16
Attending: PHYSICIAN ASSISTANT
Payer: MEDICARE

## 2018-02-16 ENCOUNTER — APPOINTMENT (OUTPATIENT)
Dept: RADIOLOGY | Facility: MEDICAL CENTER | Age: 83
End: 2018-02-16
Attending: EMERGENCY MEDICINE
Payer: MEDICARE

## 2018-02-16 VITALS
WEIGHT: 152.12 LBS | HEIGHT: 62 IN | RESPIRATION RATE: 17 BRPM | DIASTOLIC BLOOD PRESSURE: 60 MMHG | HEART RATE: 78 BPM | SYSTOLIC BLOOD PRESSURE: 138 MMHG | TEMPERATURE: 99.2 F | BODY MASS INDEX: 27.99 KG/M2 | OXYGEN SATURATION: 91 %

## 2018-02-16 DIAGNOSIS — K59.00 CONSTIPATION, UNSPECIFIED CONSTIPATION TYPE: ICD-10-CM

## 2018-02-16 DIAGNOSIS — R14.0 ABDOMINAL DISTENSION: ICD-10-CM

## 2018-02-16 PROCEDURE — 99284 EMERGENCY DEPT VISIT MOD MDM: CPT

## 2018-02-16 PROCEDURE — 74019 RADEX ABDOMEN 2 VIEWS: CPT

## 2018-02-16 ASSESSMENT — LIFESTYLE VARIABLES: DO YOU DRINK ALCOHOL: NO

## 2018-02-17 NOTE — ED PROVIDER NOTES
"ED Provider Note    Scribed for Keith Rodriguez M.D. by Keith Rodriguez. 2/16/2018,  8:32 PM.    CHIEF COMPLAINT  Chief Complaint   Patient presents with   • Bowel Obstruction       HPI  Joelle Sotelo is a 89 y.o. female who presents to the Emergency Department by EMS, reportedly because nursing at her care facility was concerned that she might have a bowel obstruction because she has not had a bowel movement today. There is no report of vomiting or abdominal pain or discomfort. The patient shows some evidence of dementia, but is able to answer questions perfectly appropriately. She denies decreased appetite, denies any abdominal pain, denies distention, or any other physical symptoms.    REVIEW OF SYSTEMS  See HPI for further details. All other systems are negative.     PAST MEDICAL HISTORY   has a past medical history of Bipolar 2 disorder (CMS-[facility]) (5/15/2013) and Dyslipidemia (5/15/2013).    SOCIAL HISTORY  Social History     Social History Main Topics   • Smoking status: Never Smoker   • Smokeless tobacco: Never Used   • Alcohol use 0.0 oz/week      Comment: glass of wine every evening, last drink 3/2/2017   • Drug use: No   • Sexual activity: No     History   Drug Use No       SURGICAL HISTORY   has a past surgical history that includes exploratory laparotomy (1/2/2015); colostomy (1/2/2015); sigmoid colectomy (1/2/2015); ercp (10/25/2015); larissa by laparoscopy (10/25/2015); and colostomy (Left).    CURRENT MEDICATIONS  Home Medications    **Home medications have not yet been reviewed for this encounter**         ALLERGIES  Allergies   Allergen Reactions   • Morphine      Pt got extremely confused and agitated after morphine dose.        PHYSICAL EXAM  VITAL SIGNS: /60   Pulse 78   Temp 37.3 °C (99.2 °F)   Resp 17   Ht 1.575 m (5' 2\")   Wt 69 kg (152 lb 1.9 oz)   SpO2 91%   BMI 27.82 kg/m²   Pulse ox interpretation: I interpret this pulse ox as normal.  Constitutional: Alert in no " apparent distress.  HENT: No signs of trauma, Bilateral external ears normal, Nose normal.   Eyes: Conjunctiva normal, Non-icteric.   Neck: Normal range of motion, Supple, No stridor.   Lymphatic: No lymphadenopathy noted.   Cardiovascular: Regular rate and rhythm, no murmurs.   Thorax & Lungs: Normal breath sounds, No respiratory distress, No wheezing, No chest tenderness.   Abdomen: Bowel sounds normal, Soft, No tenderness, No masses, No pulsatile masses. No peritoneal signs. Colostomy site clean dry and intact. Ostomy tissue appears pink and appropriate. Minimal stool in the ostomy bag.  Skin: Warm, Dry, No erythema, No rash.   Extremities: Intact distal pulses, No edema, No cyanosis.  Musculoskeletal: Good range of motion in all major joints. No or major deformities noted.   Neurologic: Alert , Normal motor function, Normal sensory function, No focal deficits noted.   Psychiatric: Affect pleasant.    DIAGNOSTIC STUDIES / PROCEDURES      LABS  Labs Reviewed - No data to display  All labs reviewed by me.    RADIOLOGY  [id number]   Final Result      1.  No evidence for obstruction      2.  Increase in expected amount of stool in the right colon which could indicate constipation        The radiologist's interpretation of all radiological studies have been reviewed by me.    COURSE & MEDICAL DECISION MAKING  Nursing notes, VS, PMSFHx reviewed in chart.     8:32 PM Patient seen and examined at bedside. The patient is not expressing any physical symptoms at all. Her history may be somewhat limited by her report of dementia, but based on discussion with her, I would not suspect bowel obstruction, and her physical exam is unremarkable. However, her care facility had reason for concern, I think it's reasonable to obtain a two-view abdominal x-ray to evaluate for any evidence of obstruction.    9:22 PM this patient's abdominal x-rays suggested constipation but did not show any evidence of bowel obstruction. I  explained this in her discharge instructions, and that her possible constipation can be treated at her care facility by stool softeners or laxatives.      The patient will return for new or worsening symptoms and is stable at the time of discharge.    The patient is referred to a primary physician for blood pressure management, diabetic screening, and for all other preventative health concerns.      DISPOSITION:  Patient will be discharged home in stable condition.    FOLLOW UP:  No follow-up provider specified.    OUTPATIENT MEDICATIONS:  Discharge Medication List as of 2/16/2018  9:52 PM            FINAL IMPRESSION  1. Constipation, unspecified constipation type

## 2018-02-17 NOTE — ED NOTES
Patient arrived via EMS from Nursing home. Patient has a colostomy and nurses at the nursing home reported she has not a BM today and was concerned of a possible bowel obstruction. Patient has dementia and is pleasantly confused

## 2018-02-17 NOTE — DISCHARGE INSTRUCTIONS
Mrs. Sotelo does not have any evidence of bowel obstruction on her physical exam or abdominal x-ray. She may have some constipation, which can be treated with stool softeners or laxatives.    Constipation, Adult  Constipation is when a person:  · Poops (has a bowel movement) less than 3 times a week.  · Has a hard time pooping.  · Has poop that is dry, hard, or bigger than normal.  HOME CARE   · Eat foods with a lot of fiber in them. This includes fruits, vegetables, beans, and whole grains such as brown rice.  · Avoid fatty foods and foods with a lot of sugar. This includes french fries, hamburgers, cookies, candy, and soda.  · If you are not getting enough fiber from food, take products with added fiber in them (supplements).  · Drink enough fluid to keep your pee (urine) clear or pale yellow.  · Exercise on a regular basis, or as told by your doctor.  · Go to the restroom when you feel like you need to poop. Do not hold it.  · Only take medicine as told by your doctor. Do not take medicines that help you poop (laxatives) without talking to your doctor first.  GET HELP RIGHT AWAY IF:   · You have bright red blood in your poop (stool).  · Your constipation lasts more than 4 days or gets worse.  · You have belly (abdominal) or butt (rectal) pain.  · You have thin poop (as thin as a pencil).  · You lose weight, and it cannot be explained.  MAKE SURE YOU:   · Understand these instructions.  · Will watch your condition.  · Will get help right away if you are not doing well or get worse.     This information is not intended to replace advice given to you by your health care provider. Make sure you discuss any questions you have with your health care provider.     Document Released: 06/05/2009 Document Revised: 01/08/2016 Document Reviewed: 09/29/2014  [employer] Interactive Patient Education ©2016 [employer]

## 2018-02-17 NOTE — ED NOTES
Patient report given off to RN at nursing home and aware of patient returning with no bowel obstruction, possible constipation. Transportation to be called

## 2018-03-01 ENCOUNTER — HOSPITAL ENCOUNTER (OUTPATIENT)
Dept: LAB | Facility: MEDICAL CENTER | Age: 83
End: 2018-03-01
Attending: INTERNAL MEDICINE
Payer: MEDICARE

## 2018-03-31 ENCOUNTER — HOSPITAL ENCOUNTER (OUTPATIENT)
Dept: LAB | Facility: MEDICAL CENTER | Age: 83
End: 2018-03-31
Attending: INTERNAL MEDICINE
Payer: MEDICARE

## 2018-04-01 ENCOUNTER — HOSPITAL ENCOUNTER (OUTPATIENT)
Dept: LAB | Facility: MEDICAL CENTER | Age: 83
End: 2018-04-01
Attending: INTERNAL MEDICINE
Payer: MEDICARE

## 2018-04-27 LAB
ANION GAP SERPL CALC-SCNC: 8 MMOL/L (ref 0–11.9)
BNP SERPL-MCNC: 23 PG/ML (ref 0–100)
BUN SERPL-MCNC: 27 MG/DL (ref 8–22)
CALCIUM SERPL-MCNC: 9 MG/DL (ref 8.5–10.5)
CHLORIDE SERPL-SCNC: 103 MMOL/L (ref 96–112)
CO2 SERPL-SCNC: 27 MMOL/L (ref 20–33)
CREAT SERPL-MCNC: 0.55 MG/DL (ref 0.5–1.4)
GLUCOSE SERPL-MCNC: 94 MG/DL (ref 65–99)
POTASSIUM SERPL-SCNC: 4.2 MMOL/L (ref 3.6–5.5)
SODIUM SERPL-SCNC: 138 MMOL/L (ref 135–145)

## 2018-04-27 PROCEDURE — 80048 BASIC METABOLIC PNL TOTAL CA: CPT

## 2018-04-27 PROCEDURE — 83880 ASSAY OF NATRIURETIC PEPTIDE: CPT | Mod: GZ

## 2018-05-01 ENCOUNTER — HOSPITAL ENCOUNTER (OUTPATIENT)
Dept: LAB | Facility: MEDICAL CENTER | Age: 83
End: 2018-05-01
Attending: INTERNAL MEDICINE
Payer: MEDICARE

## 2018-05-08 ENCOUNTER — APPOINTMENT (OUTPATIENT)
Dept: RADIOLOGY | Facility: IMAGING CENTER | Age: 83
End: 2018-05-08
Attending: INTERNAL MEDICINE
Payer: MEDICARE

## 2018-05-08 DIAGNOSIS — R05.9 COUGH: ICD-10-CM

## 2018-05-08 LAB
ANION GAP SERPL CALC-SCNC: 9 MMOL/L (ref 0–11.9)
BASOPHILS # BLD AUTO: 0.9 % (ref 0–1.8)
BASOPHILS # BLD: 0.06 K/UL (ref 0–0.12)
BUN SERPL-MCNC: 24 MG/DL (ref 8–22)
CALCIUM SERPL-MCNC: 8.8 MG/DL (ref 8.5–10.5)
CHLORIDE SERPL-SCNC: 102 MMOL/L (ref 96–112)
CO2 SERPL-SCNC: 25 MMOL/L (ref 20–33)
CREAT SERPL-MCNC: 0.59 MG/DL (ref 0.5–1.4)
EOSINOPHIL # BLD AUTO: 0.27 K/UL (ref 0–0.51)
EOSINOPHIL NFR BLD: 3.9 % (ref 0–6.9)
ERYTHROCYTE [DISTWIDTH] IN BLOOD BY AUTOMATED COUNT: 46.9 FL (ref 35.9–50)
GLUCOSE SERPL-MCNC: 109 MG/DL (ref 65–99)
HCT VFR BLD AUTO: 41.7 % (ref 37–47)
HGB BLD-MCNC: 13.5 G/DL (ref 12–16)
IMM GRANULOCYTES # BLD AUTO: 0.02 K/UL (ref 0–0.11)
IMM GRANULOCYTES NFR BLD AUTO: 0.3 % (ref 0–0.9)
LYMPHOCYTES # BLD AUTO: 1.39 K/UL (ref 1–4.8)
LYMPHOCYTES NFR BLD: 19.9 % (ref 22–41)
MCH RBC QN AUTO: 28.2 PG (ref 27–33)
MCHC RBC AUTO-ENTMCNC: 32.4 G/DL (ref 33.6–35)
MCV RBC AUTO: 87.2 FL (ref 81.4–97.8)
MONOCYTES # BLD AUTO: 1.05 K/UL (ref 0–0.85)
MONOCYTES NFR BLD AUTO: 15.1 % (ref 0–13.4)
NEUTROPHILS # BLD AUTO: 4.18 K/UL (ref 2–7.15)
NEUTROPHILS NFR BLD: 59.9 % (ref 44–72)
NRBC # BLD AUTO: 0 K/UL
NRBC BLD-RTO: 0 /100 WBC
PLATELET # BLD AUTO: 216 K/UL (ref 164–446)
PMV BLD AUTO: 10.9 FL (ref 9–12.9)
POTASSIUM SERPL-SCNC: 4.6 MMOL/L (ref 3.6–5.5)
RBC # BLD AUTO: 4.78 M/UL (ref 4.2–5.4)
SODIUM SERPL-SCNC: 136 MMOL/L (ref 135–145)
WBC # BLD AUTO: 7 K/UL (ref 4.8–10.8)

## 2018-05-08 PROCEDURE — 85025 COMPLETE CBC W/AUTO DIFF WBC: CPT

## 2018-05-08 PROCEDURE — 80048 BASIC METABOLIC PNL TOTAL CA: CPT

## 2018-05-09 LAB
FLUAV RNA SPEC QL NAA+PROBE: NEGATIVE
FLUBV RNA SPEC QL NAA+PROBE: NEGATIVE

## 2018-05-09 PROCEDURE — 87502 INFLUENZA DNA AMP PROBE: CPT

## 2018-05-10 LAB
ANION GAP SERPL CALC-SCNC: 4 MMOL/L (ref 0–11.9)
BASOPHILS # BLD AUTO: 0.7 % (ref 0–1.8)
BASOPHILS # BLD: 0.04 K/UL (ref 0–0.12)
BNP SERPL-MCNC: 52 PG/ML (ref 0–100)
BUN SERPL-MCNC: 20 MG/DL (ref 8–22)
CALCIUM SERPL-MCNC: 8.8 MG/DL (ref 8.5–10.5)
CHLORIDE SERPL-SCNC: 102 MMOL/L (ref 96–112)
CO2 SERPL-SCNC: 29 MMOL/L (ref 20–33)
CREAT SERPL-MCNC: 0.53 MG/DL (ref 0.5–1.4)
EOSINOPHIL # BLD AUTO: 0.17 K/UL (ref 0–0.51)
EOSINOPHIL NFR BLD: 3 % (ref 0–6.9)
ERYTHROCYTE [DISTWIDTH] IN BLOOD BY AUTOMATED COUNT: 45.7 FL (ref 35.9–50)
GLUCOSE SERPL-MCNC: 106 MG/DL (ref 65–99)
HCT VFR BLD AUTO: 38.6 % (ref 37–47)
HGB BLD-MCNC: 12.3 G/DL (ref 12–16)
IMM GRANULOCYTES # BLD AUTO: 0.05 K/UL (ref 0–0.11)
IMM GRANULOCYTES NFR BLD AUTO: 0.9 % (ref 0–0.9)
LYMPHOCYTES # BLD AUTO: 1.26 K/UL (ref 1–4.8)
LYMPHOCYTES NFR BLD: 22.1 % (ref 22–41)
MCH RBC QN AUTO: 27.6 PG (ref 27–33)
MCHC RBC AUTO-ENTMCNC: 31.9 G/DL (ref 33.6–35)
MCV RBC AUTO: 86.5 FL (ref 81.4–97.8)
MONOCYTES # BLD AUTO: 1.09 K/UL (ref 0–0.85)
MONOCYTES NFR BLD AUTO: 19.2 % (ref 0–13.4)
NEUTROPHILS # BLD AUTO: 3.08 K/UL (ref 2–7.15)
NEUTROPHILS NFR BLD: 54.1 % (ref 44–72)
NRBC # BLD AUTO: 0 K/UL
NRBC BLD-RTO: 0 /100 WBC
PLATELET # BLD AUTO: 185 K/UL (ref 164–446)
PMV BLD AUTO: 10.2 FL (ref 9–12.9)
POTASSIUM SERPL-SCNC: 4 MMOL/L (ref 3.6–5.5)
RBC # BLD AUTO: 4.46 M/UL (ref 4.2–5.4)
SODIUM SERPL-SCNC: 135 MMOL/L (ref 135–145)
WBC # BLD AUTO: 5.7 K/UL (ref 4.8–10.8)

## 2018-05-10 PROCEDURE — 83880 ASSAY OF NATRIURETIC PEPTIDE: CPT

## 2018-05-10 PROCEDURE — 80048 BASIC METABOLIC PNL TOTAL CA: CPT

## 2018-05-10 PROCEDURE — 85025 COMPLETE CBC W/AUTO DIFF WBC: CPT

## 2018-05-10 PROCEDURE — 36415 COLL VENOUS BLD VENIPUNCTURE: CPT

## 2018-06-01 ENCOUNTER — HOSPITAL ENCOUNTER (OUTPATIENT)
Dept: LAB | Facility: MEDICAL CENTER | Age: 83
End: 2018-06-01
Attending: INTERNAL MEDICINE
Payer: MEDICARE

## 2018-06-23 PROCEDURE — 81001 URINALYSIS AUTO W/SCOPE: CPT

## 2018-06-24 LAB
APPEARANCE UR: CLEAR
BACTERIA #/AREA URNS HPF: NEGATIVE /HPF
BILIRUB UR QL STRIP.AUTO: NEGATIVE
COLOR UR: YELLOW
EPI CELLS #/AREA URNS HPF: NEGATIVE /HPF
GLUCOSE UR STRIP.AUTO-MCNC: NEGATIVE MG/DL
HYALINE CASTS #/AREA URNS LPF: ABNORMAL /LPF
KETONES UR STRIP.AUTO-MCNC: NEGATIVE MG/DL
LEUKOCYTE ESTERASE UR QL STRIP.AUTO: ABNORMAL
MICRO URNS: ABNORMAL
NITRITE UR QL STRIP.AUTO: NEGATIVE
PH UR STRIP.AUTO: 5.5 [PH]
PROT UR QL STRIP: NEGATIVE MG/DL
RBC # URNS HPF: ABNORMAL /HPF
RBC UR QL AUTO: ABNORMAL
SP GR UR STRIP.AUTO: 1.01
UROBILINOGEN UR STRIP.AUTO-MCNC: 0.2 MG/DL
WBC #/AREA URNS HPF: ABNORMAL /HPF

## 2018-07-01 ENCOUNTER — HOSPITAL ENCOUNTER (OUTPATIENT)
Dept: LAB | Facility: MEDICAL CENTER | Age: 83
End: 2018-07-01
Attending: INTERNAL MEDICINE
Payer: MEDICARE

## 2018-07-19 LAB
ANION GAP SERPL CALC-SCNC: 8 MMOL/L (ref 0–11.9)
APPEARANCE UR: CLEAR
BACTERIA #/AREA URNS HPF: NEGATIVE /HPF
BASOPHILS # BLD AUTO: 1.2 % (ref 0–1.8)
BASOPHILS # BLD: 0.09 K/UL (ref 0–0.12)
BILIRUB UR QL STRIP.AUTO: NEGATIVE
BNP SERPL-MCNC: 46 PG/ML (ref 0–100)
BUN SERPL-MCNC: 23 MG/DL (ref 8–22)
CALCIUM SERPL-MCNC: 9.1 MG/DL (ref 8.5–10.5)
CHLORIDE SERPL-SCNC: 104 MMOL/L (ref 96–112)
CO2 SERPL-SCNC: 28 MMOL/L (ref 20–33)
COLOR UR: YELLOW
CREAT SERPL-MCNC: 0.6 MG/DL (ref 0.5–1.4)
EOSINOPHIL # BLD AUTO: 0.32 K/UL (ref 0–0.51)
EOSINOPHIL NFR BLD: 4.3 % (ref 0–6.9)
EPI CELLS #/AREA URNS HPF: NEGATIVE /HPF
ERYTHROCYTE [DISTWIDTH] IN BLOOD BY AUTOMATED COUNT: 46.3 FL (ref 35.9–50)
GLUCOSE SERPL-MCNC: 89 MG/DL (ref 65–99)
GLUCOSE UR STRIP.AUTO-MCNC: NEGATIVE MG/DL
HCT VFR BLD AUTO: 39.2 % (ref 37–47)
HGB BLD-MCNC: 12.6 G/DL (ref 12–16)
HYALINE CASTS #/AREA URNS LPF: ABNORMAL /LPF
IMM GRANULOCYTES # BLD AUTO: 0.03 K/UL (ref 0–0.11)
IMM GRANULOCYTES NFR BLD AUTO: 0.4 % (ref 0–0.9)
KETONES UR STRIP.AUTO-MCNC: NEGATIVE MG/DL
LEUKOCYTE ESTERASE UR QL STRIP.AUTO: NEGATIVE
LYMPHOCYTES # BLD AUTO: 2.18 K/UL (ref 1–4.8)
LYMPHOCYTES NFR BLD: 29 % (ref 22–41)
MCH RBC QN AUTO: 28.1 PG (ref 27–33)
MCHC RBC AUTO-ENTMCNC: 32.1 G/DL (ref 33.6–35)
MCV RBC AUTO: 87.3 FL (ref 81.4–97.8)
MICRO URNS: ABNORMAL
MONOCYTES # BLD AUTO: 0.85 K/UL (ref 0–0.85)
MONOCYTES NFR BLD AUTO: 11.3 % (ref 0–13.4)
NEUTROPHILS # BLD AUTO: 4.05 K/UL (ref 2–7.15)
NEUTROPHILS NFR BLD: 53.8 % (ref 44–72)
NITRITE UR QL STRIP.AUTO: NEGATIVE
NRBC # BLD AUTO: 0 K/UL
NRBC BLD-RTO: 0 /100 WBC
PH UR STRIP.AUTO: 7 [PH]
PLATELET # BLD AUTO: 246 K/UL (ref 164–446)
PMV BLD AUTO: 10 FL (ref 9–12.9)
POTASSIUM SERPL-SCNC: 4 MMOL/L (ref 3.6–5.5)
PROT UR QL STRIP: NEGATIVE MG/DL
RBC # BLD AUTO: 4.49 M/UL (ref 4.2–5.4)
RBC # URNS HPF: ABNORMAL /HPF
RBC UR QL AUTO: ABNORMAL
SODIUM SERPL-SCNC: 140 MMOL/L (ref 135–145)
SP GR UR STRIP.AUTO: 1.01
UROBILINOGEN UR STRIP.AUTO-MCNC: 0.2 MG/DL
WBC # BLD AUTO: 7.5 K/UL (ref 4.8–10.8)
WBC #/AREA URNS HPF: ABNORMAL /HPF

## 2018-07-19 PROCEDURE — 81001 URINALYSIS AUTO W/SCOPE: CPT

## 2018-07-19 PROCEDURE — 83880 ASSAY OF NATRIURETIC PEPTIDE: CPT

## 2018-07-19 PROCEDURE — 85025 COMPLETE CBC W/AUTO DIFF WBC: CPT

## 2018-07-19 PROCEDURE — 80048 BASIC METABOLIC PNL TOTAL CA: CPT

## 2018-08-01 ENCOUNTER — HOSPITAL ENCOUNTER (OUTPATIENT)
Dept: LAB | Facility: MEDICAL CENTER | Age: 83
End: 2018-08-01
Attending: INTERNAL MEDICINE
Payer: MEDICARE

## 2018-09-01 ENCOUNTER — HOSPITAL ENCOUNTER (OUTPATIENT)
Dept: LAB | Facility: MEDICAL CENTER | Age: 83
End: 2018-09-01
Attending: INTERNAL MEDICINE
Payer: MEDICARE

## 2018-10-01 ENCOUNTER — HOSPITAL ENCOUNTER (OUTPATIENT)
Dept: LAB | Facility: MEDICAL CENTER | Age: 83
End: 2018-10-01
Attending: INTERNAL MEDICINE
Payer: MEDICARE

## 2018-11-01 ENCOUNTER — HOSPITAL ENCOUNTER (OUTPATIENT)
Dept: LAB | Facility: MEDICAL CENTER | Age: 83
End: 2018-11-01
Attending: INTERNAL MEDICINE
Payer: MEDICARE

## 2020-01-01 ENCOUNTER — APPOINTMENT (OUTPATIENT)
Dept: RADIOLOGY | Facility: MEDICAL CENTER | Age: 85
DRG: 871 | End: 2020-01-01
Attending: INTERNAL MEDICINE
Payer: MEDICARE

## 2020-01-01 ENCOUNTER — HOSPITAL ENCOUNTER (OUTPATIENT)
Facility: MEDICAL CENTER | Age: 85
End: 2020-04-23
Payer: MEDICARE

## 2020-01-01 ENCOUNTER — APPOINTMENT (OUTPATIENT)
Dept: RADIOLOGY | Facility: MEDICAL CENTER | Age: 85
DRG: 871 | End: 2020-01-01
Attending: EMERGENCY MEDICINE
Payer: MEDICARE

## 2020-01-01 ENCOUNTER — HOSPITAL ENCOUNTER (INPATIENT)
Facility: MEDICAL CENTER | Age: 85
LOS: 1 days | DRG: 871 | End: 2020-06-18
Attending: EMERGENCY MEDICINE | Admitting: HOSPITALIST
Payer: MEDICARE

## 2020-01-01 VITALS — TEMPERATURE: 96.5 F | HEIGHT: 66 IN | WEIGHT: 180 LBS | BODY MASS INDEX: 28.93 KG/M2

## 2020-01-01 DIAGNOSIS — R79.89 ELEVATED TROPONIN: ICD-10-CM

## 2020-01-01 DIAGNOSIS — E86.0 DEHYDRATION: ICD-10-CM

## 2020-01-01 DIAGNOSIS — R41.82 ALTERED MENTAL STATUS, UNSPECIFIED ALTERED MENTAL STATUS TYPE: ICD-10-CM

## 2020-01-01 DIAGNOSIS — N17.9 SEPSIS WITH ACUTE RENAL FAILURE WITHOUT SEPTIC SHOCK, DUE TO UNSPECIFIED ORGANISM, UNSPECIFIED ACUTE RENAL FAILURE TYPE (HCC): ICD-10-CM

## 2020-01-01 DIAGNOSIS — N17.9 ACUTE RENAL FAILURE, UNSPECIFIED ACUTE RENAL FAILURE TYPE (HCC): ICD-10-CM

## 2020-01-01 DIAGNOSIS — R65.20 SEPSIS WITH ACUTE RENAL FAILURE WITHOUT SEPTIC SHOCK, DUE TO UNSPECIFIED ORGANISM, UNSPECIFIED ACUTE RENAL FAILURE TYPE (HCC): ICD-10-CM

## 2020-01-01 DIAGNOSIS — E87.0 HYPERNATREMIA: ICD-10-CM

## 2020-01-01 DIAGNOSIS — A41.9 SEPSIS WITH ACUTE RENAL FAILURE WITHOUT SEPTIC SHOCK, DUE TO UNSPECIFIED ORGANISM, UNSPECIFIED ACUTE RENAL FAILURE TYPE (HCC): ICD-10-CM

## 2020-01-01 LAB
ACTION RANGE TRIGGERED IACRT: NO
ALBUMIN SERPL BCP-MCNC: 2.8 G/DL (ref 3.2–4.9)
ALBUMIN/GLOB SERPL: 0.7 G/DL
ALP SERPL-CCNC: 141 U/L (ref 30–99)
ALT SERPL-CCNC: 56 U/L (ref 2–50)
AMORPH CRY #/AREA URNS HPF: PRESENT /HPF
ANION GAP SERPL CALC-SCNC: 15 MMOL/L (ref 7–16)
ANION GAP SERPL CALC-SCNC: 17 MMOL/L (ref 7–16)
ANION GAP SERPL CALC-SCNC: 19 MMOL/L (ref 7–16)
ANION GAP SERPL CALC-SCNC: 21 MMOL/L (ref 7–16)
APPEARANCE UR: ABNORMAL
AST SERPL-CCNC: 58 U/L (ref 12–45)
BACTERIA #/AREA URNS HPF: NEGATIVE /HPF
BASE EXCESS BLDA CALC-SCNC: -8 MMOL/L (ref -4–3)
BASO STIPL BLD QL SMEAR: NORMAL
BASOPHILS # BLD AUTO: 0 % (ref 0–1.8)
BASOPHILS # BLD AUTO: 0 % (ref 0–1.8)
BASOPHILS # BLD: 0 K/UL (ref 0–0.12)
BASOPHILS # BLD: 0 K/UL (ref 0–0.12)
BILIRUB SERPL-MCNC: 2.5 MG/DL (ref 0.1–1.5)
BILIRUB UR QL STRIP.AUTO: ABNORMAL
BODY TEMPERATURE: ABNORMAL DEGREES
BUN SERPL-MCNC: 208 MG/DL (ref 8–22)
BUN SERPL-MCNC: 215 MG/DL (ref 8–22)
BUN SERPL-MCNC: 223 MG/DL (ref 8–22)
BUN SERPL-MCNC: 231 MG/DL (ref 8–22)
CALCIUM SERPL-MCNC: 8.1 MG/DL (ref 8.5–10.5)
CALCIUM SERPL-MCNC: 8.3 MG/DL (ref 8.5–10.5)
CALCIUM SERPL-MCNC: 8.6 MG/DL (ref 8.5–10.5)
CALCIUM SERPL-MCNC: 8.7 MG/DL (ref 8.5–10.5)
CHLORIDE SERPL-SCNC: 117 MMOL/L (ref 96–112)
CHLORIDE SERPL-SCNC: 118 MMOL/L (ref 96–112)
CHLORIDE SERPL-SCNC: 119 MMOL/L (ref 96–112)
CHLORIDE SERPL-SCNC: 122 MMOL/L (ref 96–112)
CHLORIDE UR-SCNC: <20 MMOL/L
CK SERPL-CCNC: 266 U/L (ref 0–154)
CO2 BLDA-SCNC: 17 MMOL/L (ref 20–33)
CO2 SERPL-SCNC: 18 MMOL/L (ref 20–33)
CO2 SERPL-SCNC: 19 MMOL/L (ref 20–33)
COLOR UR: ABNORMAL
COVID ORDER STATUS COVID19: NORMAL
CREAT SERPL-MCNC: 3.83 MG/DL (ref 0.5–1.4)
CREAT SERPL-MCNC: 4.22 MG/DL (ref 0.5–1.4)
CREAT SERPL-MCNC: 4.54 MG/DL (ref 0.5–1.4)
CREAT SERPL-MCNC: 4.62 MG/DL (ref 0.5–1.4)
CREAT UR-MCNC: 175.49 MG/DL
EKG IMPRESSION: NORMAL
EOSINOPHIL # BLD AUTO: 0 K/UL (ref 0–0.51)
EOSINOPHIL # BLD AUTO: 0 K/UL (ref 0–0.51)
EOSINOPHIL NFR BLD: 0 % (ref 0–6.9)
EOSINOPHIL NFR BLD: 0 % (ref 0–6.9)
EPI CELLS #/AREA URNS HPF: NEGATIVE /HPF
ERYTHROCYTE [DISTWIDTH] IN BLOOD BY AUTOMATED COUNT: 63.1 FL (ref 35.9–50)
ERYTHROCYTE [DISTWIDTH] IN BLOOD BY AUTOMATED COUNT: 64 FL (ref 35.9–50)
GLOBULIN SER CALC-MCNC: 4 G/DL (ref 1.9–3.5)
GLUCOSE SERPL-MCNC: 172 MG/DL (ref 65–99)
GLUCOSE SERPL-MCNC: 187 MG/DL (ref 65–99)
GLUCOSE SERPL-MCNC: 82 MG/DL (ref 65–99)
GLUCOSE SERPL-MCNC: 97 MG/DL (ref 65–99)
GLUCOSE UR STRIP.AUTO-MCNC: 100 MG/DL
HCO3 BLDA-SCNC: 16.4 MMOL/L (ref 17–25)
HCT VFR BLD AUTO: 50.6 % (ref 37–47)
HCT VFR BLD AUTO: 56.1 % (ref 37–47)
HGB BLD-MCNC: 15.8 G/DL (ref 12–16)
HGB BLD-MCNC: 17.7 G/DL (ref 12–16)
HOROWITZ INDEX BLDA+IHG-RTO: 66 MM[HG]
HYALINE CASTS #/AREA URNS LPF: ABNORMAL /LPF
INST. QUALIFIED PATIENT IIQPT: YES
KETONES UR STRIP.AUTO-MCNC: NEGATIVE MG/DL
LACTATE BLD-SCNC: 3 MMOL/L (ref 0.5–2)
LACTATE BLD-SCNC: 3.6 MMOL/L (ref 0.5–2)
LEUKOCYTE ESTERASE UR QL STRIP.AUTO: ABNORMAL
LG PLATELETS BLD QL SMEAR: NORMAL
LYMPHOCYTES # BLD AUTO: 0.62 K/UL (ref 1–4.8)
LYMPHOCYTES # BLD AUTO: 0.76 K/UL (ref 1–4.8)
LYMPHOCYTES NFR BLD: 10.7 % (ref 22–41)
LYMPHOCYTES NFR BLD: 20.5 % (ref 22–41)
MACROCYTES BLD QL SMEAR: ABNORMAL
MANUAL DIFF BLD: NORMAL
MANUAL DIFF BLD: NORMAL
MCH RBC QN AUTO: 29.2 PG (ref 27–33)
MCH RBC QN AUTO: 29.2 PG (ref 27–33)
MCHC RBC AUTO-ENTMCNC: 31.2 G/DL (ref 33.6–35)
MCHC RBC AUTO-ENTMCNC: 31.6 G/DL (ref 33.6–35)
MCV RBC AUTO: 92.6 FL (ref 81.4–97.8)
MCV RBC AUTO: 93.4 FL (ref 81.4–97.8)
METAMYELOCYTES NFR BLD MANUAL: 4.9 %
METAMYELOCYTES NFR BLD MANUAL: 9.8 %
MICRO URNS: ABNORMAL
MONOCYTES # BLD AUTO: 0.14 K/UL (ref 0–0.85)
MONOCYTES # BLD AUTO: 0.64 K/UL (ref 0–0.85)
MONOCYTES NFR BLD AUTO: 4.5 % (ref 0–13.4)
MONOCYTES NFR BLD AUTO: 9 % (ref 0–13.4)
MORPHOLOGY BLD-IMP: NORMAL
MORPHOLOGY BLD-IMP: NORMAL
MYELOCYTES NFR BLD MANUAL: 1.8 %
NEUTROPHILS # BLD AUTO: 1.9 K/UL (ref 2–7.15)
NEUTROPHILS # BLD AUTO: 5.35 K/UL (ref 2–7.15)
NEUTROPHILS NFR BLD: 55.4 % (ref 44–72)
NEUTROPHILS NFR BLD: 75.4 % (ref 44–72)
NEUTS BAND NFR BLD MANUAL: 8 % (ref 0–10)
NITRITE UR QL STRIP.AUTO: POSITIVE
NRBC # BLD AUTO: 0.11 K/UL
NRBC # BLD AUTO: 0.13 K/UL
NRBC BLD-RTO: 1.8 /100 WBC
NRBC BLD-RTO: 3.6 /100 WBC
O2/TOTAL GAS SETTING VFR VENT: 100 %
OVALOCYTES BLD QL SMEAR: NORMAL
PCO2 BLDA: 31 MMHG (ref 26–37)
PCO2 TEMP ADJ BLDA: 29.3 MMHG (ref 26–37)
PH BLDA: 7.33 [PH] (ref 7.4–7.5)
PH TEMP ADJ BLDA: 7.35 [PH] (ref 7.4–7.5)
PH UR STRIP.AUTO: 5 [PH] (ref 5–8)
PLATELET # BLD AUTO: 86 K/UL (ref 164–446)
PLATELET # BLD AUTO: 96 K/UL (ref 164–446)
PLATELET BLD QL SMEAR: NORMAL
PLATELET BLD QL SMEAR: NORMAL
PO2 BLDA: 66 MMHG (ref 64–87)
PO2 TEMP ADJ BLDA: 61 MMHG (ref 64–87)
POIKILOCYTOSIS BLD QL SMEAR: NORMAL
POLYCHROMASIA BLD QL SMEAR: NORMAL
POTASSIUM SERPL-SCNC: 5 MMOL/L (ref 3.6–5.5)
POTASSIUM SERPL-SCNC: 5.3 MMOL/L (ref 3.6–5.5)
POTASSIUM SERPL-SCNC: 5.5 MMOL/L (ref 3.6–5.5)
POTASSIUM SERPL-SCNC: 5.6 MMOL/L (ref 3.6–5.5)
POTASSIUM UR-SCNC: 96 MMOL/L
PROT SERPL-MCNC: 6.8 G/DL (ref 6–8.2)
PROT UR QL STRIP: 30 MG/DL
PROT UR-MCNC: 56 MG/DL (ref 0–15)
RBC # BLD AUTO: 5.42 M/UL (ref 4.2–5.4)
RBC # BLD AUTO: 6.06 M/UL (ref 4.2–5.4)
RBC # URNS HPF: ABNORMAL /HPF
RBC BLD AUTO: PRESENT
RBC UR QL AUTO: NEGATIVE
SAO2 % BLDA: 92 % (ref 93–99)
SARS-COV-2 RNA RESP QL NAA+PROBE: NOTDETECTED
SODIUM SERPL-SCNC: 151 MMOL/L (ref 135–145)
SODIUM SERPL-SCNC: 155 MMOL/L (ref 135–145)
SODIUM SERPL-SCNC: 155 MMOL/L (ref 135–145)
SODIUM SERPL-SCNC: 162 MMOL/L (ref 135–145)
SODIUM UR-SCNC: 20 MMOL/L
SP GR UR STRIP.AUTO: 1.02
SPECIMEN DRAWN FROM PATIENT: ABNORMAL
SPECIMEN SOURCE: NORMAL
TROPONIN T SERPL-MCNC: 164 NG/L (ref 6–19)
UROBILINOGEN UR STRIP.AUTO-MCNC: 1 MG/DL
WBC # BLD AUTO: 3 K/UL (ref 4.8–10.8)
WBC # BLD AUTO: 7.1 K/UL (ref 4.8–10.8)
WBC #/AREA URNS HPF: ABNORMAL /HPF

## 2020-01-01 PROCEDURE — B54MZZA ULTRASONOGRAPHY OF RIGHT UPPER EXTREMITY VEINS, GUIDANCE: ICD-10-PCS | Performed by: INTERNAL MEDICINE

## 2020-01-01 PROCEDURE — 700111 HCHG RX REV CODE 636 W/ 250 OVERRIDE (IP): Performed by: HOSPITALIST

## 2020-01-01 PROCEDURE — 700105 HCHG RX REV CODE 258: Performed by: HOSPITALIST

## 2020-01-01 PROCEDURE — 36415 COLL VENOUS BLD VENIPUNCTURE: CPT

## 2020-01-01 PROCEDURE — 70450 CT HEAD/BRAIN W/O DYE: CPT

## 2020-01-01 PROCEDURE — 87040 BLOOD CULTURE FOR BACTERIA: CPT

## 2020-01-01 PROCEDURE — 05HB33Z INSERTION OF INFUSION DEVICE INTO RIGHT BASILIC VEIN, PERCUTANEOUS APPROACH: ICD-10-PCS | Performed by: INTERNAL MEDICINE

## 2020-01-01 PROCEDURE — 71045 X-RAY EXAM CHEST 1 VIEW: CPT

## 2020-01-01 PROCEDURE — 99223 1ST HOSP IP/OBS HIGH 75: CPT | Mod: AI | Performed by: HOSPITALIST

## 2020-01-01 PROCEDURE — 99291 CRITICAL CARE FIRST HOUR: CPT | Performed by: INTERNAL MEDICINE

## 2020-01-01 PROCEDURE — 85007 BL SMEAR W/DIFF WBC COUNT: CPT

## 2020-01-01 PROCEDURE — 85027 COMPLETE CBC AUTOMATED: CPT

## 2020-01-01 PROCEDURE — 51798 US URINE CAPACITY MEASURE: CPT

## 2020-01-01 PROCEDURE — 36600 WITHDRAWAL OF ARTERIAL BLOOD: CPT

## 2020-01-01 PROCEDURE — 84156 ASSAY OF PROTEIN URINE: CPT

## 2020-01-01 PROCEDURE — 700111 HCHG RX REV CODE 636 W/ 250 OVERRIDE (IP): Performed by: INTERNAL MEDICINE

## 2020-01-01 PROCEDURE — 84300 ASSAY OF URINE SODIUM: CPT

## 2020-01-01 PROCEDURE — 80048 BASIC METABOLIC PNL TOTAL CA: CPT

## 2020-01-01 PROCEDURE — 700105 HCHG RX REV CODE 258: Performed by: PSYCHIATRY & NEUROLOGY

## 2020-01-01 PROCEDURE — 87086 URINE CULTURE/COLONY COUNT: CPT

## 2020-01-01 PROCEDURE — 84133 ASSAY OF URINE POTASSIUM: CPT

## 2020-01-01 PROCEDURE — 700105 HCHG RX REV CODE 258: Performed by: INTERNAL MEDICINE

## 2020-01-01 PROCEDURE — 76937 US GUIDE VASCULAR ACCESS: CPT

## 2020-01-01 PROCEDURE — 76775 US EXAM ABDO BACK WALL LIM: CPT

## 2020-01-01 PROCEDURE — 96365 THER/PROPH/DIAG IV INF INIT: CPT

## 2020-01-01 PROCEDURE — 81001 URINALYSIS AUTO W/SCOPE: CPT

## 2020-01-01 PROCEDURE — 700111 HCHG RX REV CODE 636 W/ 250 OVERRIDE (IP): Performed by: EMERGENCY MEDICINE

## 2020-01-01 PROCEDURE — 82550 ASSAY OF CK (CPK): CPT

## 2020-01-01 PROCEDURE — 82803 BLOOD GASES ANY COMBINATION: CPT

## 2020-01-01 PROCEDURE — 94640 AIRWAY INHALATION TREATMENT: CPT

## 2020-01-01 PROCEDURE — 99291 CRITICAL CARE FIRST HOUR: CPT

## 2020-01-01 PROCEDURE — 93005 ELECTROCARDIOGRAM TRACING: CPT | Performed by: EMERGENCY MEDICINE

## 2020-01-01 PROCEDURE — 82570 ASSAY OF URINE CREATININE: CPT

## 2020-01-01 PROCEDURE — 700105 HCHG RX REV CODE 258: Performed by: EMERGENCY MEDICINE

## 2020-01-01 PROCEDURE — U0004 COV-19 TEST NON-CDC HGH THRU: HCPCS

## 2020-01-01 PROCEDURE — 80053 COMPREHEN METABOLIC PANEL: CPT

## 2020-01-01 PROCEDURE — 99292 CRITICAL CARE ADDL 30 MIN: CPT | Performed by: INTERNAL MEDICINE

## 2020-01-01 PROCEDURE — 770022 HCHG ROOM/CARE - ICU (200)

## 2020-01-01 PROCEDURE — 83605 ASSAY OF LACTIC ACID: CPT

## 2020-01-01 PROCEDURE — 82436 ASSAY OF URINE CHLORIDE: CPT

## 2020-01-01 PROCEDURE — 84484 ASSAY OF TROPONIN QUANT: CPT

## 2020-01-01 PROCEDURE — C9803 HOPD COVID-19 SPEC COLLECT: HCPCS | Performed by: EMERGENCY MEDICINE

## 2020-01-01 RX ORDER — LORAZEPAM 2 MG/ML
1 CONCENTRATE ORAL
Status: DISCONTINUED | OUTPATIENT
Start: 2020-01-01 | End: 2020-01-01 | Stop reason: HOSPADM

## 2020-01-01 RX ORDER — OXCARBAZEPINE 150 MG/1
150 TABLET, FILM COATED ORAL 2 TIMES DAILY
COMMUNITY

## 2020-01-01 RX ORDER — BISACODYL 10 MG
10 SUPPOSITORY, RECTAL RECTAL
Status: DISCONTINUED | OUTPATIENT
Start: 2020-01-01 | End: 2020-01-01

## 2020-01-01 RX ORDER — ONDANSETRON 4 MG/1
8 TABLET, ORALLY DISINTEGRATING ORAL EVERY 8 HOURS PRN
Status: DISCONTINUED | OUTPATIENT
Start: 2020-01-01 | End: 2020-01-01 | Stop reason: HOSPADM

## 2020-01-01 RX ORDER — POTASSIUM CHLORIDE 750 MG/1
10 TABLET, EXTENDED RELEASE ORAL DAILY
COMMUNITY

## 2020-01-01 RX ORDER — DIVALPROEX SODIUM 250 MG/1
250 TABLET, DELAYED RELEASE ORAL 2 TIMES DAILY
COMMUNITY

## 2020-01-01 RX ORDER — LORAZEPAM 2 MG/ML
1 INJECTION INTRAMUSCULAR
Status: DISCONTINUED | OUTPATIENT
Start: 2020-01-01 | End: 2020-01-01 | Stop reason: HOSPADM

## 2020-01-01 RX ORDER — SODIUM CHLORIDE, SODIUM LACTATE, POTASSIUM CHLORIDE, CALCIUM CHLORIDE 600; 310; 30; 20 MG/100ML; MG/100ML; MG/100ML; MG/100ML
1000 INJECTION, SOLUTION INTRAVENOUS ONCE
Status: COMPLETED | OUTPATIENT
Start: 2020-01-01 | End: 2020-01-01

## 2020-01-01 RX ORDER — AZITHROMYCIN 500 MG/5ML
500 INJECTION, POWDER, LYOPHILIZED, FOR SOLUTION INTRAVENOUS EVERY 24 HOURS
Status: DISCONTINUED | OUTPATIENT
Start: 2020-01-01 | End: 2020-01-01

## 2020-01-01 RX ORDER — MORPHINE SULFATE 10 MG/ML
5-10 INJECTION, SOLUTION INTRAMUSCULAR; INTRAVENOUS
Status: DISCONTINUED | OUTPATIENT
Start: 2020-01-01 | End: 2020-01-01

## 2020-01-01 RX ORDER — ACETAMINOPHEN 500 MG
1000 TABLET ORAL 2 TIMES DAILY
COMMUNITY

## 2020-01-01 RX ORDER — ALBUTEROL SULFATE 90 UG/1
2 AEROSOL, METERED RESPIRATORY (INHALATION) EVERY 4 HOURS PRN
COMMUNITY

## 2020-01-01 RX ORDER — ATROPINE SULFATE 10 MG/ML
2 SOLUTION/ DROPS OPHTHALMIC EVERY 4 HOURS PRN
Status: DISCONTINUED | OUTPATIENT
Start: 2020-01-01 | End: 2020-01-01 | Stop reason: HOSPADM

## 2020-01-01 RX ORDER — MEMANTINE HYDROCHLORIDE 10 MG/1
10 TABLET ORAL 2 TIMES DAILY
COMMUNITY

## 2020-01-01 RX ORDER — POLYETHYLENE GLYCOL 3350 17 G/17G
1 POWDER, FOR SOLUTION ORAL
Status: DISCONTINUED | OUTPATIENT
Start: 2020-01-01 | End: 2020-01-01

## 2020-01-01 RX ORDER — SODIUM CHLORIDE, SODIUM LACTATE, POTASSIUM CHLORIDE, AND CALCIUM CHLORIDE .6; .31; .03; .02 G/100ML; G/100ML; G/100ML; G/100ML
1000 INJECTION, SOLUTION INTRAVENOUS
Status: DISCONTINUED | OUTPATIENT
Start: 2020-01-01 | End: 2020-01-01

## 2020-01-01 RX ORDER — ONDANSETRON 2 MG/ML
8 INJECTION INTRAMUSCULAR; INTRAVENOUS EVERY 8 HOURS PRN
Status: DISCONTINUED | OUTPATIENT
Start: 2020-01-01 | End: 2020-01-01 | Stop reason: HOSPADM

## 2020-01-01 RX ORDER — SODIUM CHLORIDE, SODIUM LACTATE, POTASSIUM CHLORIDE, CALCIUM CHLORIDE 600; 310; 30; 20 MG/100ML; MG/100ML; MG/100ML; MG/100ML
INJECTION, SOLUTION INTRAVENOUS CONTINUOUS
Status: DISCONTINUED | OUTPATIENT
Start: 2020-01-01 | End: 2020-01-01

## 2020-01-01 RX ORDER — MORPHINE SULFATE 10 MG/ML
10-15 INJECTION, SOLUTION INTRAMUSCULAR; INTRAVENOUS
Status: DISCONTINUED | OUTPATIENT
Start: 2020-01-01 | End: 2020-01-01

## 2020-01-01 RX ORDER — MIRTAZAPINE 7.5 MG/1
7.5 TABLET, FILM COATED ORAL
COMMUNITY

## 2020-01-01 RX ORDER — SODIUM CHLORIDE 450 MG/100ML
INJECTION, SOLUTION INTRAVENOUS CONTINUOUS
Status: DISCONTINUED | OUTPATIENT
Start: 2020-01-01 | End: 2020-01-01

## 2020-01-01 RX ORDER — AMOXICILLIN 250 MG
2 CAPSULE ORAL 2 TIMES DAILY
Status: DISCONTINUED | OUTPATIENT
Start: 2020-01-01 | End: 2020-01-01

## 2020-01-01 RX ORDER — LORAZEPAM 2 MG/ML
INJECTION INTRAMUSCULAR
Status: DISCONTINUED
Start: 2020-01-01 | End: 2020-01-01 | Stop reason: HOSPADM

## 2020-01-01 RX ORDER — HEPARIN SODIUM 5000 [USP'U]/ML
5000 INJECTION, SOLUTION INTRAVENOUS; SUBCUTANEOUS EVERY 8 HOURS
Status: DISCONTINUED | OUTPATIENT
Start: 2020-01-01 | End: 2020-01-01

## 2020-01-01 RX ORDER — FUROSEMIDE 20 MG/1
20 TABLET ORAL EVERY MORNING
COMMUNITY

## 2020-01-01 RX ADMIN — AZITHROMYCIN MONOHYDRATE 500 MG: 500 INJECTION, POWDER, LYOPHILIZED, FOR SOLUTION INTRAVENOUS at 17:17

## 2020-01-01 RX ADMIN — CEFTRIAXONE SODIUM 1 G: 1 INJECTION, POWDER, FOR SOLUTION INTRAMUSCULAR; INTRAVENOUS at 13:50

## 2020-01-01 RX ADMIN — FENTANYL CITRATE 100 MCG: 50 INJECTION INTRAMUSCULAR; INTRAVENOUS at 13:17

## 2020-01-01 RX ADMIN — HEPARIN SODIUM 5000 UNITS: 5000 INJECTION, SOLUTION INTRAVENOUS; SUBCUTANEOUS at 05:12

## 2020-01-01 RX ADMIN — SODIUM CHLORIDE, POTASSIUM CHLORIDE, SODIUM LACTATE AND CALCIUM CHLORIDE 1000 ML: 600; 310; 30; 20 INJECTION, SOLUTION INTRAVENOUS at 13:30

## 2020-01-01 RX ADMIN — SODIUM CHLORIDE: 4.5 INJECTION, SOLUTION INTRAVENOUS at 22:48

## 2020-01-01 RX ADMIN — CEFTRIAXONE SODIUM 2 G: 2 INJECTION, POWDER, FOR SOLUTION INTRAMUSCULAR; INTRAVENOUS at 05:07

## 2020-01-01 RX ADMIN — LORAZEPAM 1 MG: 2 INJECTION INTRAMUSCULAR; INTRAVENOUS at 13:15

## 2020-01-01 RX ADMIN — SODIUM CHLORIDE, POTASSIUM CHLORIDE, SODIUM LACTATE AND CALCIUM CHLORIDE: 600; 310; 30; 20 INJECTION, SOLUTION INTRAVENOUS at 17:16

## 2020-01-01 RX ADMIN — HEPARIN SODIUM 5000 UNITS: 5000 INJECTION, SOLUTION INTRAVENOUS; SUBCUTANEOUS at 22:47

## 2020-01-01 RX ADMIN — AZITHROMYCIN MONOHYDRATE 500 MG: 500 INJECTION, POWDER, LYOPHILIZED, FOR SOLUTION INTRAVENOUS at 05:45

## 2020-04-23 NOTE — PROGRESS NOTES
TRIAGE OFFICER NOTE:    -Received a call from the transfer center about this 89 y.o female, who lives at [facility] who was tested positive for COVID-19, with hypoxia.   -Transfer center then informed me that SNF has cancelled the request for direct admission as patient has improved and turned-around, and they will keep her at the SNF.

## 2020-06-17 PROBLEM — N39.0 URINARY TRACT INFECTION: Status: ACTIVE | Noted: 2020-01-01

## 2020-06-17 PROBLEM — J96.01 ACUTE RESPIRATORY FAILURE WITH HYPOXIA (HCC): Status: ACTIVE | Noted: 2020-01-01

## 2020-06-17 PROBLEM — Z66 DNR (DO NOT RESUSCITATE): Status: ACTIVE | Noted: 2020-01-01

## 2020-06-17 PROBLEM — D69.6 THROMBOCYTOPENIA (HCC): Status: ACTIVE | Noted: 2020-01-01

## 2020-06-17 PROBLEM — G93.40 ENCEPHALOPATHY ACUTE: Status: ACTIVE | Noted: 2020-01-01

## 2020-06-17 PROBLEM — J18.9 PNEUMONIA DUE TO INFECTIOUS ORGANISM: Status: ACTIVE | Noted: 2020-01-01

## 2020-06-17 PROBLEM — E87.5 HYPERKALEMIA: Status: ACTIVE | Noted: 2020-01-01

## 2020-06-17 PROBLEM — E87.0 HYPERNATREMIA: Status: ACTIVE | Noted: 2020-01-01

## 2020-06-17 PROBLEM — R74.01 ELEVATED TRANSAMINASE LEVEL: Status: ACTIVE | Noted: 2020-01-01

## 2020-06-17 PROBLEM — N17.9 ACUTE KIDNEY FAILURE (HCC): Status: ACTIVE | Noted: 2020-01-01

## 2020-06-17 NOTE — ASSESSMENT & PLAN NOTE
Appears prerenal, likely due to dehydration  CPK mildly elevated  Creatinine only mildly improved today  IVF  Renal dose meds, avoid nephrotoxins  Strict I/Os  Follow renal function  Renal ultrasound without obstruction

## 2020-06-17 NOTE — ED TRIAGE NOTES
"Chief Complaint   Patient presents with   • ALOC     Found this morning by [facility] home altered, mottled skin,    BIB [facility] FROM [facility] FOR ALTERED MENTAL STATUS THIS MORNING WHEN CHECKED ON BY STAFF. LAST SEEN NORMAL LAST NIGHT.    /53   Pulse 83   Temp 35.9 °C (96.6 °F) (Temporal)   Resp 16   Ht 1.676 m (5' 6\")   Wt 81.6 kg (180 lb)   SpO2 94%   BMI 29.05 kg/m²     "

## 2020-06-17 NOTE — ASSESSMENT & PLAN NOTE
Rocephin, azithromycin  Left lower lobe infiltrate on chest x-ray with hypoxia and significant work of breathing

## 2020-06-17 NOTE — ASSESSMENT & PLAN NOTE
Severe encephalopathy  Likely due to severe metabolic derangements  Continue to treat underlying conditions  Limit sedatives

## 2020-06-17 NOTE — CONSULTS
"Reason for PC Consult: Advance Care Planning    Consulted by: Dr. Parker    Assessment:  General: 89 y/o woman from [place] for AMS and found down by evening staff. Recent COVID infection, but recovered. Other PMHx of anemia, bipolar 2 disorder, HCC, DDD, dyslipidemia, lymphedema, and osteoarthritis. Pt admitted with acute renal failure, dehydration, hypernatremia, and AMS. BUN/Cr 231/4.62, K 5.6. Pt currently on a non-rebreather, mumbling and altered.  Per friend Suraj, Joelle is usually alert and able to carry on a conversation.    Social: Joelle was a previous resident of [facility] but currently at [place]. She is  and her one son  from cancer \"many decades ago.\"    Consults: critical care    Dyspnea: Yes-    Last BM:  -    Pain: No-    Depression: Unable to determine-    Dementia: Unable to Determine;       Spiritual:  Is Holiness or spirituality important for coping with this illness? Unable to determine-    Has a  or spiritual provider visit been requested? Unable to determine    Palliative Performance Scale: 20%    Advance Directive: None- financial POA only   DPOA: No-    POLST: No-  One on the chart for full code/full treat    Code Status: DNR- DNI    Outcome:  Consult received and EMR reviewed; case discussed with Dr. Parker at bedside. Pt comes from [facility] and is altered. POA paperwork scanned into the EMR is financial only and names friend Jorje Williamson then Suraj Williamson as financial POAs. Call placed to [facility] and reached the  who tells this RN that Joelle has no family, was  many years ago and her son is . She states they have no medical POA paperwork on file for her either. She notes that friend Suraj is the only one who call/checks on Joelle.     Updates to Dr. Parker and plan made for ethics  due to lack of NOK and pt being unrepresented. Request for NOK search from case management sent.     Updated: Bioethics; Dr. Parker    Plan: " ethics consult placed; NOK search requested. Ethics and PC will f/y Thursday    Thank you for allowing Palliative Care to support this patient and family. Contact [phone] for additional assistance, change in patient status, or with any questions/concerns.

## 2020-06-17 NOTE — ASSESSMENT & PLAN NOTE
Likely due to dehydration and hypotension  Continue resuscitation  Follow  Metabolic toxins, monitor synthetic function

## 2020-06-17 NOTE — ED NOTES
MD UPDATED WITH CRITICAL LAB RESULT. CT CANCELLED D/T CONTRAST. MRI TO BE PLACED INSTEAD. ABX TO BE STARTED. WILL CONTINUE TO MONITOR

## 2020-06-17 NOTE — ASSESSMENT & PLAN NOTE
Hypovolemic hypernatremia  Severely dehydrated  Resuscitate with LR, consider transitioning to D5 1/2NS  6-hour sodium checks

## 2020-06-17 NOTE — ED NOTES
Med Rec complete per Pt's MAR from [facility] and [facility]  Allergies Reviewed  No ABX listed on MAR

## 2020-06-17 NOTE — PROGRESS NOTES
89 yo female with AMS non verbal, asymmetric pupils CT head neg.   Hypernatremia, leslie, hyperkalemia lactic acidosis, uti  Patient is full code will probably require ICU admission.   Dr leal notified about admission.

## 2020-06-17 NOTE — ED NOTES
BIB EMS FROM [place], ALTERED, LAST SEEN NORM LAST NIGHT, MOTTLED, INCOMPREHENSIVE SPEECH. NOTED RIGHT PUPIL DILATED AND SLUGGISH, GAZE TO THE LEFT. CONNECTED TO ALL MONITORING, SATS 81% ON RA, PLACED ON NRB @15L. MD ORDERS RECEIVED. WILL CONTINUE TO MONITOR

## 2020-06-17 NOTE — H&P
Hospital Medicine History & Physical Note    Date of Service  6/17/2020    Primary Care Physician  No primary care provider on file.    Code Status  DNAR, I OK    Chief Complaint  Chief Complaint   Patient presents with   • ALOC     Found this morning by [facility] altered, mottled skin,       History of Presenting Illness  89 y.o. female who presented 6/17/2020 with decreased level of consciousness.  Ms. Sotelo past medical history of bipolar disorder that recent was diagnosed with COVID on 4/23/2020 absolutely reportedly has had 3- COVID tests since.  She currently resides at [facility].  She was brought in a decreased level of consciousness.  In the emergency room she is found to have abnormal chest x-ray suspicious for pneumonia as well as a positive urinalysis consistent with urinary tract infection.  She has a severely elevated BUN/creatinine consistent with acute renal failure and hypernatremia with a sodium of 155.  In the emergency room she is requiring a nonrebreather and is minimally responsive.  Her advanced directives do state that she is a full code.  She does have dementia and currently is a not in a position to be able to communicate her wishes.  I spoke with her power of , Suraj at [phone] tamir patient's very dire status.  She is agreeable that NR and DNI are appropriate.  We discussed that if her condition does not improve that comfort care measures and hospice may be appropriate. Suraj is agreeable with this plan.  I told her that we would update her.    Review of Systems  Review of Systems   Unable to perform ROS: Acuity of condition       Past Medical History   has a past medical history of Anemia, Bipolar 2 disorder (HCC) (5/15/2013), DDD (degenerative disc disease), cervical, Dyslipidemia (5/15/2013), Lymphedema, and Osteoarthritis.    Surgical History   has a past surgical history that includes exploratory laparotomy (1/2/2015); colostomy (1/2/2015); sigmoid colectomy  (1/2/2015); ercp (10/25/2015); larissa by laparoscopy (10/25/2015); and colostomy (Left).     Family History  family history includes Other in her father and mother.     Social History   reports that she has never smoked. She has never used smokeless tobacco. She reports current alcohol use. She reports that she does not use drugs.    Allergies  Allergies   Allergen Reactions   • Morphine      Pt got extremely confused and agitated after morphine dose.        Medications  Prior to Admission Medications   Prescriptions Last Dose Informant Patient Reported? Taking?   albuterol 108 (90 Base) MCG/ACT Aero Soln inhalation aerosol 6/15/2020 at [facility] Caregiver Yes Yes   Sig: Inhale 2 Puffs by mouth every four hours as needed for Shortness of Breath.   divalproex (DEPAKOTE) 250 MG Tablet Delayed Response 6/13/2020 at PM Caregiver Yes Yes   Sig: Take 250 mg by mouth 2 Times a Day.   furosemide (LASIX) 20 MG Tab 6/14/2020 at AM Caregiver Yes Yes   Sig: Take 20 mg by mouth every morning.   memantine (NAMENDA) 10 MG Tab 6/12/2020 at PM Caregiver Yes Yes   Sig: Take 10 mg by mouth every evening.      Facility-Administered Medications: None       Physical Exam  Temp:  [35.9 °C (96.6 °F)] 35.9 °C (96.6 °F)  Pulse:  [77-96] 77  Resp:  [16-26] 24  BP: ()/(50-71) 87/55  SpO2:  [75 %-93 %] 88 %    Physical Exam  Vitals signs and nursing note reviewed.   Constitutional:       Comments: Pale, toxic appearing   HENT:      Head: Normocephalic and atraumatic.      Nose:      Comments: Right pupil is larger than the left     Mouth/Throat:      Mouth: Mucous membranes are dry.      Pharynx: Oropharynx is clear.   Eyes:      General: No scleral icterus.     Conjunctiva/sclera: Conjunctivae normal.   Neck:      Musculoskeletal: Normal range of motion and neck supple.   Cardiovascular:      Comments: Distant regular heart sounds without appreciable murmur  Pulmonary:      Comments: Tachypnea, nonrebreather, poor air movement  Abdominal:       General: There is no distension.      Tenderness: There is no abdominal tenderness.      Comments: Ostomy bag   Musculoskeletal:      Right lower leg: No edema.      Left lower leg: No edema.   Skin:     General: Skin is dry.      Coloration: Skin is pale.   Neurological:      Comments: She is effectively nonverbal and does not follow any commands   Psychiatric:      Comments: Ill-appearing and somnolent         Laboratory:  Recent Labs     06/17/20  1223   WBC 7.1   RBC 6.06*   HEMOGLOBIN 17.7*   HEMATOCRIT 56.1*   MCV 92.6   MCH 29.2   MCHC 31.6*   RDW 63.1*   PLATELETCT 96*     Recent Labs     06/17/20  1223   SODIUM 155*   POTASSIUM 5.6*   CHLORIDE 118*   CO2 18*   GLUCOSE 187*   *   CREATININE 4.62*   CALCIUM 8.7     Recent Labs     06/17/20  1223   ALTSGPT 56*   ASTSGOT 58*   ALKPHOSPHAT 141*   TBILIRUBIN 2.5*   GLUCOSE 187*         No results for input(s): NTPROBNP in the last 72 hours.      Recent Labs     06/17/20  1223   TROPONINT 164*       Imaging:  DX-CHEST-PORTABLE (1 VIEW)   Final Result      1.  Left basilar atelectasis and/or consolidation, small underlying pleural effusion is not excluded.   2.  Stable enlargement of the cardiomediastinal silhouette.      CT-HEAD W/O   Final Result      1.  No evidence of acute territorial infarct, intracranial hemorrhage or mass lesion.   2.  Moderate diffuse cerebral substance loss.   3.  Moderate microangiopathic ischemic change versus demyelination or gliosis.            Assessment/Plan:      * Acute respiratory failure with hypoxia (HCC)- (present on admission)  Assessment & Plan  Likely secondary to pneumonia--query aspiration pneumonia  IV antibiotics  Requiring a non-rebreather thus ICU admission and critical care consultation.  She had COVID in April but 3 negative COVIDs since and negative COVID here    Acute kidney failure (HCC)- (present on admission)  Assessment & Plan  Patient has severely elevated BUN and creatinine very to sepsis and  dehydration will be given aggressive IV fluids  Follow urine output  A basic metabolic panel is been ordered for the morning    Sepsis (HCC)- (present on admission)  Assessment & Plan  This is Severe Sepsis Present on admission  SIRS criteria identified on my evaluation include: Tachypnea, with respirations greater than 20 per minute  Source of infection is pneumonia and/or UTI  Clinical indicators of end organ dysfunction include Creatinine >2.0 (Without ESRD or CKD)  Sepsis protocol initiated  Fluid resuscitation ordered per protocol  IV antibiotics as appropriate for source of sepsis  Reassessment: I have reassessed the patient's hemodynamic status  End organ dysfunction include(s):  Acute kidney failure      Lactic acidosis- (present on admission)  Assessment & Plan  Canary to sepsis, IV fluids have been ordered    Encephalopathy acute- (present on admission)  Assessment & Plan  Severe, acute metabolic encephalopathy Canary to dehydration and sepsis    DNR (do not resuscitate)- (present on admission)  Assessment & Plan  This was discussed with her POA Suraj [phone]  She agrees with DNR/DNI  She is aware that comfort care may be the appropriate next step    Hypernatremia- (present on admission)  Assessment & Plan  Sodium is markedly elevated at 155  The lactated Ringer's has been ordered with serial basic metabolic panels ordered every 6 hours    Dementia (HCC)- (present on admission)  Assessment & Plan  Is a chronic condition for her

## 2020-06-17 NOTE — ASSESSMENT & PLAN NOTE
DNR/DNI per Dr Dueñas's discussion with patient's POA.  Given patient's current multisystem organ failure and agreement amongst the treatment team that DNR/DNI is most appropriate CODE STATUS she will be placed DNR/DNI.  Pending ethics consultation to determine best course given this unrepresented patient

## 2020-06-17 NOTE — ASSESSMENT & PLAN NOTE
Likely secondary to pneumonia--query aspiration pneumonia  IV antibiotics  Requiring a non-rebreather thus ICU admission and critical care consultation.  She had COVID in April but 3 negative COVIDs since and negative COVID here

## 2020-06-17 NOTE — CONSULTS
Critical Care Consultation    Date of consult: 6/17/2020    Referring Physician  Rajni Urbina M.D.    Reason for Consultation  AMS, ARF    History of Presenting Illness  89 y.o. female with past medical history of bipolar disorder, dyslipidemia, anemia, and recent diagnosis of COVID-19 with successful recovery and subsequently 3 negative coronavirus PCR tests who presented 6/17/2020 with altered mental status.  Patient was apparently found down by staff and confused.  These symptoms started as early as possible yesterday.  She was noted to be mottled with incomprehensible speech and hypoxic on room air by EMS and was given 1 L of crystalloid.  In the ER the patient was found to have continued hypotension requiring additional crystalloid resuscitation.  Labs show a sodium of 155, potassium 5.6, chloride 118, CO2 18, glucose 187, , creatinine 462, AST 58, ALT 56, alkaline phosphatase 141, total bilirubin 2.5, anion gap 19, lactic acid 3.6, hemoglobin 17.7, platelet count 96.  Chest x-ray showed left basilar atelectasis with a small effusion and cardiomegaly.  CT head showed no acute abnormality just chronic changes. The patient's condition was discussed with her POA Surja by Dr. Dwayne Dueñas, the patient has a recent POLST denoting Full Code however given the severity of her condition Suraj felt it would be best to change her code status to DNR/DNI, this status change was agreed to be the most appropriate by the entire treatment team.    Code Status  DNAR, I OK    Review of Systems  Review of Systems   Unable to perform ROS: Mental acuity       Past Medical History   has a past medical history of Anemia, Bipolar 2 disorder (HCC) (5/15/2013), DDD (degenerative disc disease), cervical, Dyslipidemia (5/15/2013), Lymphedema, and Osteoarthritis.    Surgical History   has a past surgical history that includes exploratory laparotomy (1/2/2015); colostomy (1/2/2015); sigmoid colectomy (1/2/2015); ercp (10/25/2015);  larissa by laparoscopy (10/25/2015); and colostomy (Left).    Family History  family history includes Other in her father and mother.    Social History   reports that she has never smoked. She has never used smokeless tobacco. She reports current alcohol use. She reports that she does not use drugs.    Medications  Home Medications     Reviewed by Grazyna Villarreal (Pharmacy Tech) on 06/17/20 at 1159  Med List Status: Partial   Medication Last Dose Status   albuterol 108 (90 Base) MCG/ACT Aero Soln inhalation aerosol 6/15/2020 Active   divalproex (DEPAKOTE) 250 MG Tablet Delayed Response 6/13/2020 Active   furosemide (LASIX) 20 MG Tab 6/14/2020 Active   memantine (NAMENDA) 10 MG Tab 6/12/2020 Active              Current Facility-Administered Medications   Medication Dose Route Frequency Provider Last Rate Last Dose   • senna-docusate (PERICOLACE or SENOKOT S) 8.6-50 MG per tablet 2 Tab  2 Tab Oral BID Dwayne Dueñas M.D.        And   • polyethylene glycol/lytes (MIRALAX) PACKET 1 Packet  1 Packet Oral QDAY PRN Dwayne Dueñas M.D.        And   • magnesium hydroxide (MILK OF MAGNESIA) suspension 30 mL  30 mL Oral QDAY PRN Dwayne Dueñas M.D.        And   • bisacodyl (DULCOLAX) suppository 10 mg  10 mg Rectal QDAY PRN Dwayne Dueañs M.D.       • Respiratory Therapy Consult   Nebulization Continuous RT Dwayne Dueñas M.D.       • lactated ringers infusion   Intravenous Continuous Dwayne Dueñas M.D.       • heparin injection 5,000 Units  5,000 Units Subcutaneous Q8HRS Dwayne Dueñas M.D.       • cefTRIAXone (ROCEPHIN) 2 g in  mL IVPB  2 g Intravenous Q24HRS Dwayne Dueñas M.D.         Current Outpatient Medications   Medication Sig Dispense Refill   • albuterol 108 (90 Base) MCG/ACT Aero Soln inhalation aerosol Inhale 2 Puffs by mouth every four hours as needed for Shortness of Breath.     • divalproex (DEPAKOTE) 250 MG Tablet Delayed Response Take 250 mg by mouth 2 Times a Day.     • furosemide (LASIX) 20 MG  Tab Take 20 mg by mouth every morning.     • memantine (NAMENDA) 10 MG Tab Take 10 mg by mouth every evening.         Allergies  Allergies   Allergen Reactions   • Morphine      Pt got extremely confused and agitated after morphine dose.        Vital Signs last 24 hours  Temp:  [35.9 °C (96.6 °F)] 35.9 °C (96.6 °F)  Pulse:  [77-96] 77  Resp:  [16-26] 24  BP: ()/(50-71) 87/55  SpO2:  [75 %-93 %] 88 %    Physical Exam  Physical Exam  Vitals signs and nursing note reviewed.   Constitutional:       General: She is in acute distress.      Appearance: She is toxic-appearing.   HENT:      Head: Normocephalic and atraumatic.      Right Ear: External ear normal.      Left Ear: External ear normal.      Nose: Nose normal.      Mouth/Throat:      Mouth: Mucous membranes are dry.   Eyes:      Conjunctiva/sclera: Conjunctivae normal.      Comments: Pupils unequal however appear postsurgical   Neck:      Musculoskeletal: No neck rigidity or muscular tenderness.   Cardiovascular:      Rate and Rhythm: Normal rate and regular rhythm.   Pulmonary:      Effort: Tachypnea and respiratory distress present.   Abdominal:      General: Abdomen is flat. Bowel sounds are normal. There is no distension.      Palpations: Abdomen is soft.      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   Musculoskeletal: Normal range of motion.   Skin:     Capillary Refill: Capillary refill takes 2 to 3 seconds.      Comments: Sacral wound   Neurological:      GCS: GCS eye subscore is 3. GCS verbal subscore is 2. GCS motor subscore is 5.      Cranial Nerves: Cranial nerves are intact.      Motor: No tremor or abnormal muscle tone.      Comments: Moaning, does not form words.  Quickly falls back to sleep when not stimulated.  Moving all 4 extremities.   Psychiatric:         Speech: She is noncommunicative.      Comments: Unable to assess           Fluids    Intake/Output Summary (Last 24 hours) at 6/17/2020 1505  Last data filed at  6/17/2020 1451  Gross per 24 hour   Intake 100 ml   Output --   Net 100 ml       Laboratory  Recent Results (from the past 48 hour(s))   CBC WITH DIFFERENTIAL    Collection Time: 06/17/20 12:23 PM   Result Value Ref Range    WBC 7.1 4.8 - 10.8 K/uL    RBC 6.06 (H) 4.20 - 5.40 M/uL    Hemoglobin 17.7 (H) 12.0 - 16.0 g/dL    Hematocrit 56.1 (H) 37.0 - 47.0 %    MCV 92.6 81.4 - 97.8 fL    MCH 29.2 27.0 - 33.0 pg    MCHC 31.6 (L) 33.6 - 35.0 g/dL    RDW 63.1 (H) 35.9 - 50.0 fL    Platelet Count 96 (L) 164 - 446 K/uL    Neutrophils-Polys 75.40 (H) 44.00 - 72.00 %    Lymphocytes 10.70 (L) 22.00 - 41.00 %    Monocytes 9.00 0.00 - 13.40 %    Eosinophils 0.00 0.00 - 6.90 %    Basophils 0.00 0.00 - 1.80 %    Nucleated RBC 1.80 /100 WBC    Neutrophils (Absolute) 5.35 2.00 - 7.15 K/uL    Lymphs (Absolute) 0.76 (L) 1.00 - 4.80 K/uL    Monos (Absolute) 0.64 0.00 - 0.85 K/uL    Eos (Absolute) 0.00 0.00 - 0.51 K/uL    Baso (Absolute) 0.00 0.00 - 0.12 K/uL    NRBC (Absolute) 0.13 K/uL    Macrocytosis 1+    COMP METABOLIC PANEL    Collection Time: 06/17/20 12:23 PM   Result Value Ref Range    Sodium 155 (H) 135 - 145 mmol/L    Potassium 5.6 (H) 3.6 - 5.5 mmol/L    Chloride 118 (H) 96 - 112 mmol/L    Co2 18 (L) 20 - 33 mmol/L    Anion Gap 19.0 (H) 7.0 - 16.0    Glucose 187 (H) 65 - 99 mg/dL    Bun 231 (HH) 8 - 22 mg/dL    Creatinine 4.62 (H) 0.50 - 1.40 mg/dL    Calcium 8.7 8.5 - 10.5 mg/dL    AST(SGOT) 58 (H) 12 - 45 U/L    ALT(SGPT) 56 (H) 2 - 50 U/L    Alkaline Phosphatase 141 (H) 30 - 99 U/L    Total Bilirubin 2.5 (H) 0.1 - 1.5 mg/dL    Albumin 2.8 (L) 3.2 - 4.9 g/dL    Total Protein 6.8 6.0 - 8.2 g/dL    Globulin 4.0 (H) 1.9 - 3.5 g/dL    A-G Ratio 0.7 g/dL   TROPONIN    Collection Time: 06/17/20 12:23 PM   Result Value Ref Range    Troponin T 164 (H) 6 - 19 ng/L   LACTIC ACID    Collection Time: 06/17/20 12:23 PM   Result Value Ref Range    Lactic Acid 3.6 (H) 0.5 - 2.0 mmol/L   PLATELET ESTIMATE    Collection Time: 06/17/20  12:23 PM   Result Value Ref Range    Plt Estimation Decreased    MORPHOLOGY    Collection Time: 06/17/20 12:23 PM   Result Value Ref Range    RBC Morphology Present     Large Platelets 1+     Polychromia 1+     Poikilocytosis 1+     Ovalocytes 1+     Basophilic Stippling Few    PERIPHERAL SMEAR REVIEW    Collection Time: 06/17/20 12:23 PM   Result Value Ref Range    Peripheral Smear Review see below    DIFFERENTIAL MANUAL    Collection Time: 06/17/20 12:23 PM   Result Value Ref Range    Metamyelocytes 4.90 %    Manual Diff Status PERFORMED    ESTIMATED GFR    Collection Time: 06/17/20 12:23 PM   Result Value Ref Range    GFR If  11 (A) >60 mL/min/1.73 m 2    GFR If Non African American 9 (A) >60 mL/min/1.73 m 2   COVID/SARS CoV-2 PCR    Collection Time: 06/17/20 12:37 PM    Specimen: Nasopharyngeal; Respirate   Result Value Ref Range    COVID Order Status Received    SARS-CoV-2, PCR (In-House)    Collection Time: 06/17/20 12:37 PM   Result Value Ref Range    SARS-CoV-2 Source NP Swab     SARS-CoV-2 by PCR NotDetected    URINALYSIS CULTURE, IF INDICATED    Collection Time: 06/17/20 12:40 PM    Specimen: Urine   Result Value Ref Range    Color DK Yellow     Character Cloudy (A)     Specific Gravity 1.024 <1.035    Ph 5.0 5.0 - 8.0    Glucose 100 (A) Negative mg/dL    Ketones Negative Negative mg/dL    Protein 30 (A) Negative mg/dL    Bilirubin Large (A) Negative    Urobilinogen, Urine 1.0 Negative    Nitrite Positive (A) Negative    Leukocyte Esterase Small (A) Negative    Occult Blood Negative Negative    Micro Urine Req Microscopic    URINE MICROSCOPIC (W/UA)    Collection Time: 06/17/20 12:40 PM   Result Value Ref Range    WBC 2-5 /hpf    RBC 0-2 /hpf    Bacteria Negative None /hpf    Epithelial Cells Negative /hpf    Amorphous Crystal Present /hpf    Hyaline Cast 3-5 (A) /lpf       Imaging  DX-CHEST-PORTABLE (1 VIEW)   Final Result      1.  Left basilar atelectasis and/or consolidation, small  underlying pleural effusion is not excluded.   2.  Stable enlargement of the cardiomediastinal silhouette.      CT-HEAD W/O   Final Result      1.  No evidence of acute territorial infarct, intracranial hemorrhage or mass lesion.   2.  Moderate diffuse cerebral substance loss.   3.  Moderate microangiopathic ischemic change versus demyelination or gliosis.      US-RENAL    (Results Pending)       Assessment/Plan  * Acute respiratory failure with hypoxia (HCC)- (present on admission)  Assessment & Plan  DNR/DNI per Dr. Dueñas's discussion with POA  History of COVID-19 in April however has reportedly had 3 negative coronavirus PCR tests since and a negative test in our ER.  RT/O2 Protocols  Titrate supplemental FiO2 to maintain SpO2 >92%    Acute kidney failure (HCC)- (present on admission)  Assessment & Plan  Appears prerenal, likely due to dehydration  Check CPK, urine electrolytes  IVF  Renal dose meds, avoid nephrotoxins  Strict I/Os  Follow renal function  Renal ultrasound to rule out obstruction    Sepsis (HCC)- (present on admission)  Assessment & Plan  This is Sepsis Present on admission  SIRS criteria identified on my evaluation include: Tachycardia, with heart rate greater than 90 BPM and Tachypnea, with respirations greater than 20 per minute  Source is urinary vs pulmonary  Sepsis protocol initiated  Fluid resuscitation ordered per protocol  IV antibiotics as appropriate for source of sepsis  While organ dysfunction may be noted elsewhere in this problem list or in the chart, degree of organ dysfunction does not meet CMS criteria for severe sepsis    This does not appear to be related to coronavirus given multiple negative coronavirus tests including 1 in our emergency department  sepsis protocol  source targeted antibiotics: rocephin/azithromycin  30 mL/kg crystalloid bolus provided in ED  PRN IVF bolus to maintain MAP >65 mmHg  F/U blood, respiratory and urine cultures  lactate every 4 hours until  normalized or downtrending  No heroic measures per POA    Lactic acidosis- (present on admission)  Assessment & Plan  Secondary to severe dehydration, sepsis  Continue crystalloid resuscitation    Thrombocytopenia (HCC)- (present on admission)  Assessment & Plan  Likely secondary to sepsis, no active bleeding  Monitor for bleeding and transfuse as needed    Elevated transaminase level- (present on admission)  Assessment & Plan  Likely due to dehydration and hypotension  Continue resuscitation  Follow  Metabolic toxins, monitor synthetic function    Pneumonia due to infectious organism- (present on admission)  Assessment & Plan  Rocephin, azithromycin  Left lower lobe infiltrate on chest x-ray with hypoxia and significant work of breathing    Urinary tract infection- (present on admission)  Assessment & Plan  Continue Rocephin  Follow culture    Hyperkalemia- (present on admission)  Assessment & Plan  Mildly elevated, likely secondary to TYSON  Continue to monitor, treat medically if needed  Not a good candidate for dialysis    Encephalopathy acute- (present on admission)  Assessment & Plan  Reportedly more confused than baseline  Likely due to severe metabolic derangements  Continue to treat underlying conditions  Limit sedatives    DNR (do not resuscitate)- (present on admission)  Assessment & Plan  DNR/DNI per Dr Dueñas's discussion with patient's POA.  Given patient's current multisystem organ failure and agreement amongst the treatment team that DNR/DNI is most appropriate CODE STATUS she will be placed DNR/DNI.  Low threshold to transition to comfort based measures if any worsening    Hypernatremia- (present on admission)  Assessment & Plan  Hypovolemic hypernatremia  Severely dehydrated  Resuscitate with LR, consider transitioning to D5 1/2NS  6-hour sodium checks    Bipolar 2 disorder (HCC)- (present on admission)  Assessment & Plan  Current severe encephalopathy  Hold meds      Discussed patient condition and  risk of morbidity and/or mortality with Hospitalist, RN, RT, Pharmacy, Code status disscussed and ERP.      The patient remains critically ill.  Critical care time = 78 minutes in directly providing and coordinating critical care and extensive data review.  No time overlap and excludes procedures.

## 2020-06-17 NOTE — ASSESSMENT & PLAN NOTE
DNR/DNI per Dr. Dueñas's discussion with POA  History of COVID-19 in April however has reportedly had 3 negative coronavirus PCR tests since and a negative test in our ER.  RT/O2 Protocols  Titrate supplemental FiO2 to maintain SpO2 >92%  HFNC ongoing

## 2020-06-17 NOTE — ED NOTES
Med rec partially complete per RN at [facility]   RN states that pt has been refusing medications off and on since 6/02    Waiting for MAR from [facility] to complete med rec

## 2020-06-17 NOTE — ASSESSMENT & PLAN NOTE
Mildly elevated, likely secondary to TYSON  Continue to monitor, treat medically if needed  Not a good candidate for dialysis

## 2020-06-17 NOTE — ED PROVIDER NOTES
ED Provider Note    CHIEF COMPLAINT  Chief Complaint   Patient presents with   • ALOC     Found this morning by [facility] home altered, mottled skin,       HPI  Joelle Sotelo is a 89 y.o. female who presents with altered mental status.  Apparently she was found down by staff altered.  Last seen normal last night.  It is unclear what her baseline neurologic status is.    Further history is unobtainable.  Patient is unable to answer any questions.      REVIEW OF SYSTEMS  Unobtainable secondary to altered mental status.    PAST MEDICAL HISTORY   has a past medical history of Anemia, Bipolar 2 disorder (HCC) (5/15/2013), DDD (degenerative disc disease), cervical, Dyslipidemia (5/15/2013), Lymphedema, and Osteoarthritis.    SOCIAL HISTORY  Social History     Tobacco Use   • Smoking status: Never Smoker   • Smokeless tobacco: Never Used   Substance and Sexual Activity   • Alcohol use: Yes     Alcohol/week: 0.0 oz     Comment: glass of wine every evening, last drink 3/2/2017   • Drug use: No   • Sexual activity: Never       SURGICAL HISTORY   has a past surgical history that includes exploratory laparotomy (1/2/2015); colostomy (1/2/2015); sigmoid colectomy (1/2/2015); ercp (10/25/2015); larissa by laparoscopy (10/25/2015); and colostomy (Left).    CURRENT MEDICATIONS  Home Medications     Reviewed by Grazyna Villarreal (Pharmacy Tech) on 06/17/20 at 1551  Med List Status: Complete   Medication Last Dose Status   acetaminophen (TYLENOL) 500 MG Tab 6/14/2020 Active   albuterol 108 (90 Base) MCG/ACT Aero Soln inhalation aerosol 6/16/2020 Active   divalproex (DEPAKOTE) 250 MG Tablet Delayed Response 6/14/2020 Active   furosemide (LASIX) 20 MG Tab 6/14/2020 Active   Lidocaine-Menthol 4-1 % Patch 6/14/2020 Active   memantine (NAMENDA) 10 MG Tab 6/12/2020 Active   mirtazapine (REMERON) 7.5 MG tablet 6/12/2020 Active   OXcarbazepine (TRILEPTAL) 150 MG Tab 6/14/2020 Active   potassium chloride SA (K-DUR) 10 MEQ  "Tab CR 2020 Active                ALLERGIES  Allergies   Allergen Reactions   • Morphine      Pt got extremely confused and agitated after morphine dose.        PHYSICAL EXAM  VITAL SIGNS: BP (!) 97/42   Pulse 92   Temp 35.9 °C (96.6 °F) (Temporal)   Resp 20   Ht 1.676 m (5' 6\")   Wt 81.6 kg (180 lb)   SpO2 (!) 75%   BMI 29.05 kg/m² .  Constitutional: Alert, unable to answer questions.  Moaning   HENT: No signs of trauma, Bilateral external ears normal, Nose normal.   Eyes: Pupils are equal and reactive, Conjunctiva normal, Non-icteric.   Neck: Normal range of motion, No tenderness, Supple, No stridor.   Cardiovascular: Regular rate and rhythm, no murmurs.   Thorax & Lungs: Normal breath sounds, No respiratory distress, No wheezing, No chest tenderness.   Abdomen: Bowel sounds normal, Soft, No tenderness, No masses, No peritoneal signs.  Skin: Warm, Dry, No erythema, No rash.   Musculoskeletal:  no major deformities noted.   Neurologic: Alert,  No focal deficits noted.   Psychiatric: Affect normal, Judgment normal, Mood normal.       DIAGNOSTIC STUDIES / PROCEDURES      EKG  Results for orders placed or performed during the hospital encounter of 20   EKG (NOW)   Result Value Ref Range    Report       [facility].    Test Date:  2020  Pt Name:    ROSS GUTIERREZ              Department: ER  MRN:        [id number]                      Room:       RD 02  Gender:     Female                       Technician: [id number]  :        [id number]                   Requested By:EDIN ELY  Order #:    [id number]                    Reading MD: EDIN ELY    Measurements  Intervals                                Axis  Rate:       82                           P:          0  TN:         158                          QRS:        104  QRSD:       84                           T:          70  QT:         368  QTc:        430    Interpretive Statements  SINUS RHYTHM  CONSIDER " RIGHT ATRIAL ABNORMALITY  RIGHT AXIS DEVIATION  Compared to ECG 03/03/2017 12:22:37  Narrow QRS no obvious ischemia  Electronically Signed On 6- 16:04:14 PDT by EDIN ELY           LABS  Labs Reviewed   CBC WITH DIFFERENTIAL - Abnormal; Notable for the following components:       Result Value    RBC 6.06 (*)     Hemoglobin 17.7 (*)     Hematocrit 56.1 (*)     MCHC 31.6 (*)     RDW 63.1 (*)     Platelet Count 96 (*)     Neutrophils-Polys 75.40 (*)     Lymphocytes 10.70 (*)     Lymphs (Absolute) 0.76 (*)     All other components within normal limits   COMP METABOLIC PANEL - Abnormal; Notable for the following components:    Sodium 155 (*)     Potassium 5.6 (*)     Chloride 118 (*)     Co2 18 (*)     Anion Gap 19.0 (*)     Glucose 187 (*)     Bun 231 (*)     Creatinine 4.62 (*)     AST(SGOT) 58 (*)     ALT(SGPT) 56 (*)     Alkaline Phosphatase 141 (*)     Total Bilirubin 2.5 (*)     Albumin 2.8 (*)     Globulin 4.0 (*)     All other components within normal limits   TROPONIN - Abnormal; Notable for the following components:    Troponin T 164 (*)     All other components within normal limits   LACTIC ACID - Abnormal; Notable for the following components:    Lactic Acid 3.6 (*)     All other components within normal limits   URINALYSIS,CULTURE IF INDICATED - Abnormal; Notable for the following components:    Character Cloudy (*)     Glucose 100 (*)     Protein 30 (*)     Bilirubin Large (*)     Nitrite Positive (*)     Leukocyte Esterase Small (*)     All other components within normal limits    Narrative:     Expected Turn around time?->Rush (In House 2-4 hours)   URINE MICROSCOPIC (W/UA) - Abnormal; Notable for the following components:    Hyaline Cast 3-5 (*)     All other components within normal limits    Narrative:     Expected Turn around time?->Rush (In House 2-4 hours)   ESTIMATED GFR - Abnormal; Notable for the following components:    GFR If  11 (*)     GFR If Non  9  "(*)     All other components within normal limits   COVID/SARS COV-2    Narrative:     Expected Turn around time?->Rush (In House 2-4 hours)   BLOOD CULTURE    Narrative:     Per Hospital Policy: Only change Specimen Src: to \"Line\" if  specified by physician order.   SARS-COV-2, PCR (IN-HOUSE)    Narrative:     Expected Turn around time?->Rush (In House 2-4 hours)   PLATELET ESTIMATE   MORPHOLOGY   PERIPHERAL SMEAR REVIEW   DIFFERENTIAL MANUAL   BLOOD CULTURE   BASIC METABOLIC PANEL   LACTIC ACID   PROTHROMBIN TIME   URINE CULTURE(NEW)   URINE SODIUM RANDOM   URINE PROTEIN   URINE CREATININE RANDOM   URINE CHLORIDE RANDOM   URINE POTASSIUM RANDOM   CREATINE KINASE       RADIOLOGY  DX-CHEST-PORTABLE (1 VIEW)   Final Result      1.  Left basilar atelectasis and/or consolidation, small underlying pleural effusion is not excluded.   2.  Stable enlargement of the cardiomediastinal silhouette.      CT-HEAD W/O   Final Result      1.  No evidence of acute territorial infarct, intracranial hemorrhage or mass lesion.   2.  Moderate diffuse cerebral substance loss.   3.  Moderate microangiopathic ischemic change versus demyelination or gliosis.      US-RENAL    (Results Pending)       Medical records reviewed for continuity of care.  Patient was seen in February and was able to have a conversation at that time.    Differential Diagnosis includes but is not limited to: Intracranial bleed, acute stroke, electrolyte abnormality, infection/sepsis    COURSE & MEDICAL DECISION MAKING  Pertinent Labs & Imaging studies reviewed. (See chart for details)    This is a 89-year-old female that presents with altered mentation.  I spoke with Suraj her power of  who said she spoke with her the facility yesterday and this is indeed a change off of her baseline usually she is able to carry conversation and is alert.  Patient has a POLST that said full code however when I spoke with Suraj she agreed that intubating her for a irreversible " cause would be extreme.  Patient was initially slightly hypoxic with sats in the high 80s.  She is also hypotensive.  However again I did not think it was apical necessarily to be intubating this 89-year-old without knowing the underlying cause.  Suraj was agreeable with this.  Head CT was performed it did not show any acute bleed.  Patient remained nonconversant.  I was concerned for acute CVA as well and did initially want to do a CTA however the labs resulted with significant abnormalities and therefore I do not think a CT with contrast is safe at this time.    Her labs show significant dehydration with a BUN of 231 and acute renal failure with a creatinine of 4.6.  She is hyponatremic and slightly hyperkalemic.  Again it is unclear what is causing the underlying dehydration.  She was given fluids for dehydration and hypotension.  I do think she requires ICU level care for close monitoring and electrolyte correction.  She may need further imaging to see if she has any evidence of acute stroke.  She is certainly not an alteplase candidate given the onset of the symptoms is unknown.  I do think at a very minimum further discussion with Suraj should happen and patient should be DNR.  I spoke with Dr. Dueñas, hospitalist who will consult for hospitalization and agrees to have this conversation with Suraj.  Patient will be admitted to the ICU in critical condition.    CRITICAL CARE NOTE:  Critical care time was provided for 45 minutes minutes exclusive of separately billable procedures and treating other patients. This involved direct bedside patient care, speaking with family members, review of past medical records, reviewing the results of the laboratory and diagnostic studies, consulting with other physicians, as well as evaluating the effectiveness of the therapy instituted as described.        FINAL IMPRESSION  1. Altered mental status, unspecified altered mental status type     2. Hypernatremia     3. Dehydration      4. Elevated troponin     5. Acute renal failure, unspecified acute renal failure type (HCC)     6. Critical care time      This dictation has been creating using voice recognition software. The accuracy of the dictation is limited the abilities of the software.  I expect there may be some errors of grammar and possibly content. I made every attempt to manually correct the errors within my dictation. However errors related to this voice recognition software may still exist and should be interpreted within the appropriate context.    We want space in the computer anyway  The note accurately reflects work and decisions made by me.  Rajni Urbina M.D.  6/17/2020  3:02 PM

## 2020-06-17 NOTE — ASSESSMENT & PLAN NOTE
This was discussed with her POA Suraj [phone]  She agrees with DNR/DNI  She is aware that comfort care may be the appropriate next step

## 2020-06-17 NOTE — ASSESSMENT & PLAN NOTE
This is Sepsis Present on admission  SIRS criteria identified on my evaluation include: Tachycardia, with heart rate greater than 90 BPM and Tachypnea, with respirations greater than 20 per minute  Source is urinary vs pulmonary  Sepsis protocol initiated  Fluid resuscitation ordered per protocol  IV antibiotics as appropriate for source of sepsis  While organ dysfunction may be noted elsewhere in this problem list or in the chart, degree of organ dysfunction does not meet CMS criteria for severe sepsis    This does not appear to be related to coronavirus given multiple negative coronavirus tests including 1 in our emergency department  sepsis protocol  source targeted antibiotics: Continue Rocephin and a azithromycin   30 mL/kg crystalloid bolus provided in ED  PRN IVF bolus to maintain MAP >65 mmHg  F/U blood, respiratory and urine cultures  lactate every 4 hours until normalized or downtrending

## 2020-06-18 NOTE — ASSESSMENT & PLAN NOTE
Sodium is markedly elevated at 155  The lactated Ringer's has been ordered with serial basic metabolic panels ordered every 6 hours

## 2020-06-18 NOTE — DISCHARGE PLANNING
Hospital Care Management Discharge Planning     Action:   · RNCM completed NOK search on the Pt  · 1 possible relative populated and was found to be    · Nj Sotelo of [place], [place],  1923  · Nj Sotelo did not have any other possible relatives other than the Pt   · RNANGIE updated Jeny, with Ethics     Plan:    · Continue to provide support services and assistance with discharge planning as needed.

## 2020-06-18 NOTE — DISCHARGE PLANNING
Hospital Care Management Discharge Planning       Action:   · RNCM reviewed PC note from 6/17 and found that a NOK search needs to be completed as there is no family on file and only a financial power of    · RNCM called pt's friend, Suraj, to inquire about healthcare power of    · Per Suraj, a healthcare POA was completed and [facility] has all the documents   · Suraj states she does not have a copy of the healthcare POA  · Suraj updated that a call has already been placed to [facility] and that they do not have POA paperwork   · Suraj states that an outpatient SW, Juana, has assisted them in the past  · RNCM found Juana's phone number through chart review [phone]  · RNCM called Juana and had to leave a voicemail requesting a call back   · RNCM contacted Tod [facility] SW, at [phone]  · Per Tod [facility] does not have any POA paperwork on file for the Pt  · RNCM discussed case with Jeny, with Ethics  · Per Jeny, Pt's friend can complete a POLST form and assist with medical decisions once POLST is completed   · Jeny to discuss with Suraj and Jorje and set up time to complete POLST  · RNCM Discussed POLST with JANIYA Shah RN, who will assist with POLST completion         Plan:    · Continue to provide support services and assistance with discharge planning as needed.

## 2020-06-18 NOTE — PROGRESS NOTES
Patient having difficulty maintaining adequate O2 sat and in obvious respiratory distress. NT suctioned- removed large amount of brown sputum. O2 sat improved and patient appears much more comfortable.  MD Love notified  Will continue to monitor

## 2020-06-18 NOTE — CONSULTS
ETHICS CONSULTATION  Patient Name: Joelle Sotelo  MRN: [id number]  Date of Service: 6/18/2020    ETHICAL ISSUE:  An ethics consultation was requested for the patient. The patient lacks decisional capacity and is unrepresented.     REASON FOR ADMISSION AND MEDICAL INDICATIONS:  89-year-old female admitted from [facility] for altered mental status. Patient's speech was incomprehensible, patient was hypotensive and hypoxic. Patient with acute respiratory failure, pneumonia, acute kidney failure (not a good candidate for dialysis) , sepsis, lactic acidosis, thrombocytopenia, UTI, hyperkalemia, encephalopathy and hypernatremia. Recent COVID-19 infection,(4/23/32020) but recovered with three negative tests. Past medical history of anemia, bipolar two disorder, dyslipidemia, lymphedema, and osteoarthritis.    DISCUSSIONS WITH MEDICAL TEAM:  Case discussed with Lizzie DENSON RN, Alyssa DENSON RN, Stephanie RN and Chula TARIQ CM.    Case also discussed with Dr. Parker.  The medical team agree that resuscitation of the patient would not provide any meaningful benefit.  In addition it would be appropriate for  the focus of the patient's care to be her comfort based on her current clinical condition and prognosis.      CODE STATUS AT THE TIME OF ETHICS CONSULTATION REQUEST:  DNR/DNI    PATIENT’S DECISION MAKING CAPACITY:  Per the medical team the patient lacks capacity to make medical decisions.    ADVANCED DIRECTIVE/POLST FORM:  The patient has no advance directive. The patient has a POLST form executed 1/14/2020 for full code, full treatment and long-term artificial nutrition or tube feeding.  Form is signed by the patient.  Scanned into the EMR is a general Power of  not for health care noting that Jorje Williamson is the primary agent and Suraj Williamson is the alternate. The document was executed 4/21/2015.  Florinda report that patient had a DPOA-HC naming them but the document could not be found at [facility]. There is a  document from [facility] that notes patient has an advance directive but there is not a copy of the document.  The [facility] document is dated 2017.    HEALTH CARE DECISION MAKER:  Per the medical team, the patient lacks capacity to make medical decisions.  The Hospital Care Management Team conducted a next of kin search and the only relative was Nj Sotelo who is .     Per Palliative Care discussions with [facility] patient has no family and the only one that calls is Suraj Williamson.    Call place to Suraj Williamson. Suraj reports that she and her  Jorje have been friends with the patient for decades.  “She is our family”.  Jorje would take the patient to her medical appointments and Joelle would have dinner with them weekly.  Suraj reports that like the financial POA Jorje is the patient’s Health Care Power of . They do not have a copy of the document.  Suraj reports that she and Jorje know the patient very well and are the only ones in her life since her son .  Suraj and Jorje know that the patient would not want to be on machines and would not want aggressive treatment if she were not expected to return to the life she had before. Discussed if Suraj or Jorje feel that they could made decisions about the goals of care and help with medical decisions. Suraj reports Jorje is the right person to sign a POLST form. They agree that Joelle would want to be a  DNR and have the focus of her care be her comfort.  Jorje is 89 years old and Suraj has a disability so they are concerned about visiting.  Will have Palliative Care assist with completion of a new POLST form.        SOCIAL HISTORY/LIVING SITUATION PRIOR TO HOSPITALIZATION:   Patient is a resident of [facility].      DISCUSSION WITH PATIENT    Patient is not responsive to questions.    RECOMMENDATIONS:  1.  Agree with the medical team’s decision to change the focus of the patient  care to her comfort based on her clinical condition and  poor prognosis.   There is no ethical obligation to provide non-beneficial treatment. Examples of non-beneficial treatment may include, but not limited to;   Treatment that cannot accomplish the intended clinical outcome; or:  Treatment that will produce no beneficial medical effects that can reasonably be expected to be experienced by the patient; or:  Treatment in which the risk of harm may reasonably outweigh the benefit  to the  Patient.  2. A  physician may medically evaluate the patient and based upon the  evaluion may recommend new orders consistent with the most current  inforion may recommend new orders consistent with the most current  information available about the patient health status and goals of care.   Before  making modifications to a valid POLST form the physician shall  consult the patient or if the patient lacks decisionall capacity shall make  reasonable attempt to consult a representative of the patient and the  patient's attending physician.    3. Jorje and Suraj Williamson as longtime friends are appropriate to  assist  with health care decisions for the patient as they are persons who  have  exhibited special care and concern for the patient and are familiar  with  the values of the patient and are willing and able to make health care  decision for the patient.  Palliative Care to assist with completing a  POLST form.  4.  A  visit was requested for the patient.      Thank you for involving us in the care of this patient. Please let me know if you have any other questions or concerns.     Respectfully submitted,   Jeny Minor RN, MSN [facility]  Ethics Consultant  Office [phone]  Cell [phone] or [place] Text

## 2020-06-18 NOTE — PALLIATIVE CARE
Spiritual Care Note        Patient's Name: Joelle Sotelo   MRN: [id number]    YOB: 1928   Age and Gender: 89 y.o. female   [facility]   Room (and Bed): R111   Ethnicity or Nationality: white   Primary Language: English   Yazidi/Spiritual Preference: none   Place of Residence: [place], [place]   Medical Diagnoses: acute respiratory failure with hypoxia   acute kidney failure  sepsis  lactic acidosis  thrombocytopenia  elevated transaminase level  pneumonia due to infectious organism  urinary tract infection  hyperkalemia  encephalopathy acute  hypernatremia  bipolar 2 disorder    Code Status: Comfort Care/DNR    Date of Admission: 2020   Length of Stay: 1 day        Spiritual Screen Results:  Is your spiritual health or inner well-being important to you as you cope with your medical condition?   unable to determine  Would you like to receive a visit from our Spiritual Care team or your own Nondenominational or spiritual leader?   unable to determine      Crisis Visit:   patient actively dying, death    Referral From/Origin of Request:   [facility] nursing (palliative care)    Yazidi and Ritual Needs:   EOL ritual - final prayer of commendation    Observations/Symptoms:   Patient's body lying in bed.    Interaction/Conversation:   Nurse shared that patient's friend had requested a .  This  arrived just after patient had .  Prayed a final prayer of commendation over the patient.    Assessment:   need    Need:   final ritual    Interventions:   prayer    Total Time:   10 minutes      Spiritual Care Provider Information:  Chaplain SHER Eduardo  [phone]   [email or web address]

## 2020-06-18 NOTE — PROCEDURES
Vascular Access Team    Date of Insertion: 6/18/20  Arm Circumference: n/a  Line Length: 10  Line Size: 20  Vein Occupancy %: 34  Reason for Midline: Access  Labs: WBC 3.0, PLT 86, BUN 2.5, Cr 4.22, GFR 10, INR NA    Orders confirmed, vessel patency confirmed with ultrasound. Risks and benefits of procedure explained to patient and education regarding line associated bloodstream infections provided. Questions answered.     PowerGlide Midline placed in RUE per licensed provider order with ultrasound guidance. 20g, 10 cm line placed in Bacilic vein after 1 attempt(s).  Catheter inserted with brisk blood return. Secured with 0 cm external from insertion site.  Line flushed without resistance with 10 mL 0.9% normal saline.  Midline secured with Biopatch and Tegaderm.     Midline placement is confirmed by nurse using ultrasound and ability to flush and draw blood. Midline is appropriate for use at this time.  No X-ray is needed for placement confirmation. Pt tolerated procedure well.  Patient condition relayed to unit RN or ordering physician via this post procedure note in the EMR.    Ultrasound images uploaded to PACS and viewable in the EMR - yes  Ultrasound imaged printed and placed in paper chart - no      BARD PowerGlide Midline ref # [id number], Lot # [id number], Expiration Date 3/31/21

## 2020-06-18 NOTE — ASSESSMENT & PLAN NOTE
Patient has severely elevated BUN and creatinine very to sepsis and dehydration will be given aggressive IV fluids  Follow urine output  A basic metabolic panel is been ordered for the morning

## 2020-06-18 NOTE — PROGRESS NOTES
"Critical Care Progress Note    Date of admission  6/17/2020    Chief Complaint  89 y.o. female admitted 6/17/2020 with AMS, ARF    Hospital Course    \"89 y.o. female with past medical history of bipolar disorder, dyslipidemia, anemia, and recent diagnosis of COVID-19 with successful recovery and subsequently 3 negative coronavirus PCR tests who presented 6/17/2020 with altered mental status.  Patient was apparently found down by staff and confused.  These symptoms started as early as possible yesterday.  She was noted to be mottled with incomprehensible speech and hypoxic on room air by EMS and was given 1 L of crystalloid.  In the ER the patient was found to have continued hypotension requiring additional crystalloid resuscitation.  Labs show a sodium of 155, potassium 5.6, chloride 118, CO2 18, glucose 187, , creatinine 462, AST 58, ALT 56, alkaline phosphatase 141, total bilirubin 2.5, anion gap 19, lactic acid 3.6, hemoglobin 17.7, platelet count 96.  Chest x-ray showed left basilar atelectasis with a small effusion and cardiomegaly.  CT head showed no acute abnormality just chronic changes. The patient's condition was discussed with her POA Suraj by Dr. Dwayne Dueñas, the patient has a recent POLST denoting Full Code however given the severity of her condition Suraj felt it would be best to change her code status to DNR/DNI, this status change was agreed to be the most appropriate by the entire treatment team\"      Interval Problem Update  Reviewed last 24 hour events:   -Continued severe multisystem organ failure   - Neuro: GCS 11   - HR: 50s-80s   - SBP: 80s-100s   - GI: NPO   - UOP: 345 mL out overnight   - Meneses: yes   - Tm: 37.1   - Lines: PIV   - PPx: GI not indicated, DVT heparin   - 15L NRB -> HFNC   - CXR (personally reviewed and compared to prior): Worsening left lower lobe opacity    Review of Systems  Review of Systems   Unable to perform ROS: Acuity of condition        Vital Signs for last 24 " hours   Temp:  [35.5 °C (95.9 °F)-37.1 °C (98.7 °F)] 36.1 °C (97 °F)  Pulse:  [] 87  Resp:  [] 65  BP: ()/(39-73) 72/39  SpO2:  [64 %-99 %] 97 %    Hemodynamic parameters for last 24 hours       Respiratory Information for the last 24 hours       Physical Exam   Physical Exam  Vitals signs and nursing note reviewed.   Constitutional:       General: She is in acute distress.      Appearance: She is toxic-appearing.   HENT:      Head: Normocephalic and atraumatic.      Right Ear: External ear normal.      Left Ear: External ear normal.      Nose: Nose normal.      Mouth/Throat:      Mouth: Mucous membranes are dry.   Eyes:      Conjunctiva/sclera: Conjunctivae normal.      Comments: Pupils unequal however appear postsurgical   Neck:      Musculoskeletal: No neck rigidity or muscular tenderness.   Cardiovascular:      Rate and Rhythm: Normal rate and regular rhythm.   Pulmonary:      Effort: Tachypnea and respiratory distress present.      Breath sounds: Rhonchi and rales present.   Abdominal:      General: Abdomen is flat. Bowel sounds are normal. There is no distension.      Palpations: Abdomen is soft.      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  Stool in appliance   Genitourinary:     Comments: Meneses catheter in place with minimal urine output  Musculoskeletal: Normal range of motion.   Skin:     Capillary Refill: Capillary refill takes 2 to 3 seconds.      Comments: Sacral wound   Neurological:      GCS: GCS eye subscore is 3. GCS verbal subscore is 2. GCS motor subscore is 5.      Cranial Nerves: Cranial nerves are intact.      Motor: No tremor or abnormal muscle tone.      Comments: Moaning, does not form words.  Difficult to awaken at all.  Moving all 4 extremities.   Psychiatric:         Speech: She is noncommunicative.      Comments: Unable to assess         Medications  Current Facility-Administered Medications   Medication Dose Route Frequency Provider Last Rate Last  Dose   • senna-docusate (PERICOLACE or SENOKOT S) 8.6-50 MG per tablet 2 Tab  2 Tab Oral BID Dwayne Dueñas M.D.   Stopped at 06/17/20 1800    And   • polyethylene glycol/lytes (MIRALAX) PACKET 1 Packet  1 Packet Oral QDAY PRN Dwayne Dueñas M.D.        And   • magnesium hydroxide (MILK OF MAGNESIA) suspension 30 mL  30 mL Oral QDAY PRN Dwayne Dueñas M.D.        And   • bisacodyl (DULCOLAX) suppository 10 mg  10 mg Rectal QDAY PRN Dwayne Dueñas M.D.       • Respiratory Therapy Consult   Nebulization Continuous RT Dwayne Dueñas M.D.       • heparin injection 5,000 Units  5,000 Units Subcutaneous Q8HRS Dwayne Dueñas M.D.   5,000 Units at 06/18/20 0512   • cefTRIAXone (ROCEPHIN) 2 g in  mL IVPB  2 g Intravenous Q24HRS Dwayne Dueñas M.D.   Stopped at 06/18/20 0537   • lactated ringers infusion (BOLUS)  1,000 mL Intravenous Once PRN Nj Parker Jr., D.O.       • azithromycin (ZITHROMAX) 500 mg in D5W 250 mL IVPB premix  500 mg Intravenous Q24HRS Nj Parker Jr., D.O.   Stopped at 06/18/20 0645   • 1/2 NS infusion   Intravenous Continuous Clifford Love M.D. 125 mL/hr at 06/17/20 2248         Fluids    Intake/Output Summary (Last 24 hours) at 6/18/2020 0751  Last data filed at 6/18/2020 0600  Gross per 24 hour   Intake 2104.17 ml   Output 345 ml   Net 1759.17 ml       Laboratory  Recent Labs     06/17/20  1710   ISTATAPH 7.332*   ISTATAPCO2 31.0   ISTATAPO2 66   ISTATATCO2 17*   MYUIMWK9ALY 92*   ISTATARTHCO3 16.4*   ISTATARTBE -8*   ISTATTEMP 96.3 F   ISTATFIO2 100   ISTATSPEC Arterial   ISTATAPHTC 7.350*   BPGBIIVV2JF 61*     Recent Labs     06/17/20  1849   CPKTOTAL 266*     Recent Labs     06/17/20  1223 06/17/20  1849 06/18/20  0300   SODIUM 155* 162* 155*   POTASSIUM 5.6* 5.5 5.3   CHLORIDE 118* 122* 119*   CO2 18* 19* 19*   * 223* 215*   CREATININE 4.62* 4.54* 4.22*   CALCIUM 8.7 8.6 8.3*     Recent Labs     06/17/20  1223 06/17/20  1849 06/18/20  0300   ALTSGPT 56*  --   --    ASTSGOT  58*  --   --    ALKPHOSPHAT 141*  --   --    TBILIRUBIN 2.5*  --   --    GLUCOSE 187* 172* 97     Recent Labs     06/17/20  1223 06/18/20  0300   WBC 7.1 3.0*   NEUTSPOLYS 75.40* 55.40   LYMPHOCYTES 10.70* 20.50*   MONOCYTES 9.00 4.50   EOSINOPHILS 0.00 0.00   BASOPHILS 0.00 0.00   ASTSGOT 58*  --    ALTSGPT 56*  --    ALKPHOSPHAT 141*  --    TBILIRUBIN 2.5*  --      Recent Labs     06/17/20  1223 06/18/20  0300   RBC 6.06* 5.42*   HEMOGLOBIN 17.7* 15.8   HEMATOCRIT 56.1* 50.6*   PLATELETCT 96* 86*       Imaging  X-Ray:  I have personally reviewed the images and compared with prior images.    Assessment/Plan  * Acute respiratory failure with hypoxia (HCC)- (present on admission)  Assessment & Plan  DNR/DNI per Dr. Dueñas's discussion with POA  History of COVID-19 in April however has reportedly had 3 negative coronavirus PCR tests since and a negative test in our ER.  RT/O2 Protocols  Titrate supplemental FiO2 to maintain SpO2 >92%  HFNC ongoing    Acute kidney failure (HCC)- (present on admission)  Assessment & Plan  Appears prerenal, likely due to dehydration  CPK mildly elevated  Creatinine only mildly improved today  IVF  Renal dose meds, avoid nephrotoxins  Strict I/Os  Follow renal function  Renal ultrasound without obstruction    Sepsis (HCC)- (present on admission)  Assessment & Plan  This is Sepsis Present on admission  SIRS criteria identified on my evaluation include: Tachycardia, with heart rate greater than 90 BPM and Tachypnea, with respirations greater than 20 per minute  Source is urinary vs pulmonary  Sepsis protocol initiated  Fluid resuscitation ordered per protocol  IV antibiotics as appropriate for source of sepsis  While organ dysfunction may be noted elsewhere in this problem list or in the chart, degree of organ dysfunction does not meet CMS criteria for severe sepsis    This does not appear to be related to coronavirus given multiple negative coronavirus tests including 1 in our emergency  department  sepsis protocol  source targeted antibiotics: Continue Rocephin and a azithromycin   30 mL/kg crystalloid bolus provided in ED  PRN IVF bolus to maintain MAP >65 mmHg  F/U blood, respiratory and urine cultures  lactate every 4 hours until normalized or downtrending    Lactic acidosis- (present on admission)  Assessment & Plan  Secondary to severe dehydration, sepsis  Continue crystalloid resuscitation    Thrombocytopenia (HCC)- (present on admission)  Assessment & Plan  Likely secondary to sepsis, no active bleeding  Monitor for bleeding and transfuse as needed    Elevated transaminase level- (present on admission)  Assessment & Plan  Likely due to dehydration and hypotension  Continue resuscitation  Follow  Metabolic toxins, monitor synthetic function    Pneumonia due to infectious organism- (present on admission)  Assessment & Plan  Rocephin, azithromycin  Left lower lobe infiltrate on chest x-ray with hypoxia and significant work of breathing    Urinary tract infection- (present on admission)  Assessment & Plan  Continue Rocephin  Follow culture    Hyperkalemia- (present on admission)  Assessment & Plan  Mildly elevated, likely secondary to TYSON  Continue to monitor, treat medically if needed  Not a good candidate for dialysis    Encephalopathy acute- (present on admission)  Assessment & Plan  Severe encephalopathy  Likely due to severe metabolic derangements  Continue to treat underlying conditions  Limit sedatives    DNR (do not resuscitate)- (present on admission)  Assessment & Plan  DNR/DNI per Dr Dueñas's discussion with patient's POA.  Given patient's current multisystem organ failure and agreement amongst the treatment team that DNR/DNI is most appropriate CODE STATUS she will be placed DNR/DNI.  Pending ethics consultation to determine best course given this unrepresented patient    Hypernatremia- (present on admission)  Assessment & Plan  Hypovolemic hypernatremia  Severely  dehydrated  Resuscitate with LR, consider transitioning to D5 1/2NS  6-hour sodium checks    Bipolar 2 disorder (HCC)- (present on admission)  Assessment & Plan  Current severe encephalopathy  Hold meds       VTE:  Heparin  Ulcer: Not Indicated  Lines: Meneses Catheter  Ongoing indication addressed    I have performed a physical exam and reviewed and updated ROS and Plan today (6/18/2020). In review of yesterday's note (6/17/2020), there are no changes except as documented above.     Treating multisystem organ failure with aggressive crystalloid resuscitation and treating respiratory failure with multiple interventions. This patient is critically ill, at high risk for decompensation leading to worsening vital organ dysfunction and death without critical care interventions.    Discussed patient condition and risk of morbidity and/or mortality with Family, RN, RT, Pharmacy, , Code status disscussed, Charge nurse / hot rounds, Patient and Ethics consultation     The patient remains critically ill.  Critical care time = 32 minutes in directly providing and coordinating critical care and extensive data review.  No time overlap and excludes procedures.    Addendum: Ethics consultation is completed, they have discussed with the patient's financial power of  who appeared to be the best representatives for this patient given her lack of next of kin and no available representation.  At this time they would like to file a new POLST form designating comfort measures only.  Comfort measures will be placed,  will be notified.

## 2020-06-18 NOTE — ASSESSMENT & PLAN NOTE
This is Severe Sepsis Present on admission  SIRS criteria identified on my evaluation include: Tachypnea, with respirations greater than 20 per minute  Source of infection is pneumonia and/or UTI  Clinical indicators of end organ dysfunction include Creatinine >2.0 (Without ESRD or CKD)  Sepsis protocol initiated  Fluid resuscitation ordered per protocol  IV antibiotics as appropriate for source of sepsis  Reassessment: I have reassessed the patient's hemodynamic status  End organ dysfunction include(s):  Acute kidney failure

## 2020-06-18 NOTE — PROGRESS NOTES
Patient TOD 1346  Suraj- family friend notified  Dr. Parker notified  Social work notified  [facility] will accept patient due to needing to find family to notify.   [facility]or not accepting patient.

## 2020-06-18 NOTE — WOUND TEAM
ried to see pt earlier for consult. Discussion with RN and pt unsafe to turn @ this time. Unable to assess pt @ this time. Wound care to try to see tomorrow.

## 2020-06-19 LAB
BACTERIA UR CULT: NORMAL
SIGNIFICANT IND 70042: NORMAL
SITE SITE: NORMAL
SOURCE SOURCE: NORMAL

## 2020-06-22 LAB
BACTERIA BLD CULT: NORMAL
SIGNIFICANT IND 70042: NORMAL
SITE SITE: NORMAL
SOURCE SOURCE: NORMAL

## 2020-06-22 NOTE — DISCHARGE SUMMARY
"Death Summary    Cause of Death  Respiratory arrest due to encephalopathy due to septic shock due to pneumonia and UTI.    Other contributing factors: Respiratory failure, acute kidney failure, dementia    Comorbid Conditions at the Time of Death  Principal Problem:    Acute respiratory failure with hypoxia (HCC) POA: Yes  Active Problems:    Sepsis (HCC) POA: Yes    Acute kidney failure (HCC) POA: Yes    Lactic acidosis POA: Yes    Bipolar 2 disorder (HCC) POA: Yes    Dementia (HCC) POA: Yes    Hypernatremia POA: Yes    DNR (do not resuscitate) POA: Yes    Encephalopathy acute POA: Yes    Hyperkalemia POA: Yes    Urinary tract infection POA: Yes    Pneumonia due to infectious organism POA: Yes    Elevated transaminase level POA: Yes    Thrombocytopenia (HCC) POA: Yes  Resolved Problems:    * No resolved hospital problems. *      History of Presenting Illness and Hospital Course  This is a \"89 y.o. female with past medical history of bipolar disorder, dyslipidemia, anemia, and recent diagnosis of COVID-19 with successful recovery and subsequently 3 negative coronavirus PCR tests who presented 6/17/2020 with altered mental status.  Patient was apparently found down by staff and confused.  These symptoms started as early as possible yesterday.  She was noted to be mottled with incomprehensible speech and hypoxic on room air by EMS and was given 1 L of crystalloid.  In the ER the patient was found to have continued hypotension requiring additional crystalloid resuscitation.  Labs show a sodium of 155, potassium 5.6, chloride 118, CO2 18, glucose 187, , creatinine 462, AST 58, ALT 56, alkaline phosphatase 141, total bilirubin 2.5, anion gap 19, lactic acid 3.6, hemoglobin 17.7, platelet count 96.  Chest x-ray showed left basilar atelectasis with a small effusion and cardiomegaly.  CT head showed no acute abnormality just chronic changes. The patient's condition was discussed with her POA Suraj by Dr. Dwayne Dueñas, " "the patient has a recent POLST denoting Full Code however given the severity of her condition Suraj felt it would be best to change her code status to DNR/DNI, this status change was agreed to be the most appropriate by the entire treatment team.\"    Patient was admitted to the ICU where she underwent overnight resuscitation with crystalloid.  Ethics was consulted on hospital day #2 as the patient was on represented other than her financial power of  and no advanced directive with a pulsed form for full code.  It was determined that her current condition was not compatible with this POLST wish.  Given that she was underrepresented and had 2 friends available Bridger and Jorje who were her financial power of 's to make medical decisions; they were requested to assume position of healthcare decision makers and agreed.  A new POLST form was completed by Jorje designating comfort measures only.  Following this completion the patient was transitioned to comfort care, was sedated by  and subsequently .    Death Date: 20   Death Time: 1340         Pronounced By (RN1): Stephanie Hoyos  Pronounced By (RN2): Viridiana Greer    "
